# Patient Record
Sex: MALE | Employment: OTHER | URBAN - METROPOLITAN AREA
[De-identification: names, ages, dates, MRNs, and addresses within clinical notes are randomized per-mention and may not be internally consistent; named-entity substitution may affect disease eponyms.]

---

## 2017-01-16 ENCOUNTER — TELEPHONE (OUTPATIENT)
Dept: CARDIOLOGY | Facility: CLINIC | Age: 77
End: 2017-01-16

## 2017-01-16 DIAGNOSIS — I10 ESSENTIAL HYPERTENSION, BENIGN: ICD-10-CM

## 2017-01-16 DIAGNOSIS — I10 BENIGN ESSENTIAL HYPERTENSION: Primary | ICD-10-CM

## 2017-01-16 NOTE — TELEPHONE ENCOUNTER
Call from patient with a BP update. He states he has checked his BP for about 6 weeks, usually mid-morning after his morning meds. His average readings are 140/70 with a range from 135-/182/74-85. He states even with the higher readings he feels well, no headaches or vision issues. He is aware that Dr. Guidry may want to increase his medications. He states he has been compliant with the current med list of lopressor 25mg BID and lisinopril 10mg daily. He asks to be called on his home phone first.  Will message Dr. Guidry to review

## 2017-01-19 NOTE — TELEPHONE ENCOUNTER
Attempted to contact patient to discuss Dr. Guidry's recommendation for increasing lisinopril to 20mg daily, TAMANNA visit w/ bmp in 2 weeks. Left message for patient to call back

## 2017-01-20 RX ORDER — LISINOPRIL 20 MG/1
20 TABLET ORAL DAILY
Qty: 90 TABLET | Refills: 1 | Status: SHIPPED | OUTPATIENT
Start: 2017-01-20 | End: 2017-01-27

## 2017-01-20 NOTE — TELEPHONE ENCOUNTER
Spoke with patient regarding increasing Lisinopril dose to 20 mg daily. New prescription e scribed.  Patient agrees with plan and will continue to monitor and record BP's. Appt OV and BMP orders entered for 1 week as patient will be leaving 1-28-17 for 2 months in [place]. Patient transferred to scheduling to arrange appointments.

## 2017-01-27 ENCOUNTER — OFFICE VISIT (OUTPATIENT)
Dept: CARDIOLOGY | Facility: CLINIC | Age: 77
End: 2017-01-27
Attending: INTERNAL MEDICINE
Payer: COMMERCIAL

## 2017-01-27 VITALS
SYSTOLIC BLOOD PRESSURE: 150 MMHG | WEIGHT: 191 LBS | DIASTOLIC BLOOD PRESSURE: 82 MMHG | HEIGHT: 71 IN | BODY MASS INDEX: 26.74 KG/M2 | HEART RATE: 60 BPM

## 2017-01-27 DIAGNOSIS — I25.10 CORONARY ARTERY DISEASE INVOLVING NATIVE CORONARY ARTERY OF NATIVE HEART WITHOUT ANGINA PECTORIS: ICD-10-CM

## 2017-01-27 DIAGNOSIS — I10 BENIGN ESSENTIAL HYPERTENSION: Primary | ICD-10-CM

## 2017-01-27 DIAGNOSIS — I10 BENIGN ESSENTIAL HYPERTENSION: ICD-10-CM

## 2017-01-27 LAB
ANION GAP SERPL CALCULATED.3IONS-SCNC: 9.2 MMOL/L (ref 6–17)
BUN SERPL-MCNC: 18 MG/DL (ref 7–30)
CALCIUM SERPL-MCNC: 9 MG/DL (ref 8.5–10.5)
CHLORIDE SERPL-SCNC: 106 MMOL/L (ref 98–107)
CO2 SERPL-SCNC: 29 MMOL/L (ref 23–29)
CREAT SERPL-MCNC: 0.97 MG/DL (ref 0.7–1.3)
GFR SERPL CREATININE-BSD FRML MDRD: 75 ML/MIN/1.7M2
GLUCOSE SERPL-MCNC: 101 MG/DL (ref 70–105)
POTASSIUM SERPL-SCNC: 5.2 MMOL/L (ref 3.5–5.1)
SODIUM SERPL-SCNC: 139 MMOL/L (ref 136–145)

## 2017-01-27 PROCEDURE — 80048 BASIC METABOLIC PNL TOTAL CA: CPT | Performed by: INTERNAL MEDICINE

## 2017-01-27 PROCEDURE — 99213 OFFICE O/P EST LOW 20 MIN: CPT | Performed by: PHYSICIAN ASSISTANT

## 2017-01-27 PROCEDURE — 36415 COLL VENOUS BLD VENIPUNCTURE: CPT | Performed by: INTERNAL MEDICINE

## 2017-01-27 RX ORDER — AMLODIPINE BESYLATE 5 MG/1
5 TABLET ORAL DAILY
Qty: 30 TABLET | Refills: 3 | Status: SHIPPED | OUTPATIENT
Start: 2017-01-27 | End: 2017-01-27

## 2017-01-27 RX ORDER — LISINOPRIL 20 MG/1
TABLET ORAL
Qty: 90 TABLET | Refills: 1 | COMMUNITY
Start: 2017-01-27 | End: 2017-07-03

## 2017-01-27 RX ORDER — AMLODIPINE BESYLATE 5 MG/1
5 TABLET ORAL DAILY
Qty: 90 TABLET | Refills: 3 | Status: SHIPPED | OUTPATIENT
Start: 2017-01-27 | End: 2018-04-30

## 2017-01-27 NOTE — LETTER
1/27/2017    [facility]  [address] [address] [address]    RE: Saul Garcia       Dear Colleague,    I had the pleasure of seeing Saul Garcia in the [facility].    PRIMARY CARDIOLOGIST:  Dr. Jacques Guidry.      REASON FOR VISIT:  Hypertension followup.     Saul is a delightful 76-year-old gentleman with past medical history notable for CAD (bypass x4 in 2011 with LIMA to LAD, SVG to OM and a sequential SVG to first and second diagonal), hypertension, postoperative atrial fibrillation after his bypass, borderline ascending aortic dilatation (3.7 cm), hyperlipidemia, and BPH.      The patient had been doing well until approximately 2 weeks ago when he developed high blood pressures. He contact our clinic for this and his lisinopril was up titrated to 20 mg 1 time daily.  He was set up for TAMANNA followup for further evaluation of this.      Saul tells me that he is doing well without any symptoms of chest pain, jaw, neck or arm discomfort, palpitations, near-syncope, syncope, orthopnea or PND.  He continues to be active.  He states that he will be leaving town for a while on Sunday with his family.      CURRENT CARDIAC MEDICATIONS:   1.  Lisinopril 10 mg 1 time daily (this was decreased today).   2.  Metoprolol 25 mg 2 times daily.   3.  Atorvastatin 80 mg 1 time daily.   4.  Nitrostat 0.4 mg as needed for chest pain.   5.  Aspirin 81 mg 1 time daily.   6.  Amlodipine 5 mg 1 time daily (this was started today).      The remainder of his allergies, social history and review of systems were reviewed and as documented separately.      VITAL SIGNS:  Blood pressure 150/82 mmHg, pulse 60 beats per minute, weight 181 pounds.      PHYSICAL EXAMINATION:  Documented separately.      LABORATORY DATA:  Today, BMP shows sodium 139, potassium 5.2, BUN 18, creatinine 0.97, GFR 75.      ASSESSMENT AND PLAN:   Saul is a delightful 76-year-old gentleman who  comes in for hypertension followup after recent up-titration of his lisinopril from 10 to 20 mg daily.  His BMP today shows hyperkalemia with potassium 5.2.  We will decrease his lisinopril to 10 mg.  We will add amlodipine 5 mg 1 time daily to his regimen.  We did also talk about alternative option of switching his beta blockade agent to carvedilol for more alpha effect.  The patient, however, will be leaving town on Sunday, which is just in a couple of days, and therefore he will not be able to come back for followup for reevaluation of his heart rates.  We will have him start amlodipine 5 mg 1 time daily.  I would like to obtain a repeat BMP to ensure his potassium does come back down to normal range.  The patient states that he can get this done somewhere else.  I will print the lab order for him to take with him.  The patient does have a blood pressure cuff that he can use at home.  He was instructed to let us know if his blood pressures continue to be elevated.  Our goal is to be around 120/80 for him.  The patient verbalized understanding of this.      Thank you for allowing me to participate in the care of this delightful patient today.     Sincerely,    Jodi Nicolas PA-C     [facility]

## 2017-01-27 NOTE — PATIENT INSTRUCTIONS
Today's Plan:   1. Decrease Lisinopril to half tablet (10 mg) one time daily.   2. Start Amlodipine (Norvasc) one tablet one time daily.   3. Get repeat lab to check your potassium level. I will print this order for you.     If you have questions or concerns please call my nurse at [phone].     Scheduling phone number: [phone]  Reminder: Please bring in all current medications, over the counter supplements and vitamin bottles to your next appointment.    It was a pleasure seeing you today!     Jodi Nicolas  1/27/2017

## 2017-01-27 NOTE — MR AVS SNAPSHOT
After Visit Summary   1/27/2017    Saul Garcia    MRN: [id number]           Patient Information     Date Of Birth          1940        Visit Information        Provider Department      1/27/2017 10:10 AM Jodi Nicolas PA-C [facility]        Today's Diagnoses     Benign essential hypertension           Care Instructions    Today's Plan:   1. Decrease Lisinopril to half tablet (10 mg) one time daily.   2. Start Amlodipine (Norvasc) one tablet one time daily.   3. Get repeat lab to check your potassium level. I will print this order for you.     If you have questions or concerns please call my nurse at [phone].     Scheduling phone number: [phone]  Reminder: Please bring in all current medications, over the counter supplements and vitamin bottles to your next appointment.    It was a pleasure seeing you today!     Jodi Nicolas  1/27/2017            Follow-ups after your visit        Future tests that were ordered for you today     Open Future Orders        Priority Expected Expires Ordered    Basic metabolic panel Routine 2/2/2017 1/27/2019 1/27/2017            Who to contact     If you have questions or need follow up information about today's clinic visit or your schedule please contact [facility] directly at [phone].  Normal or non-critical lab and imaging results will be communicated to you by MyChart, letter or phone within 4 business days after the clinic has received the results. If you do not hear from us within 7 days, please contact the clinic through MyChart or phone. If you have a critical or abnormal lab result, we will notify you by phone as soon as possible.  Submit refill requests through [employer] or call your pharmacy and they will forward the refill request to us. Please allow 3 business days for your refill to be completed.          Additional Information About Your Visit       "  MyChart Information     [employer]ay lets you send messages to your doctor, view your test results, renew your prescriptions, schedule appointments and more. To sign up, go to [email or web address] . Click on \"Log in\" on the left side of the screen, which will take you to the Welcome page. Then click on \"Sign up Now\" on the right side of the page.     You will be asked to enter the access code listed below, as well as some personal information. Please follow the directions to create your username and password.     Your access code is: [email or web address]  Expires: 2017 11:03 AM     Your access code will  in 90 days. If you need help or a new code, please call your [place] clinic or [phone].        Care EveryWhere ID     This is your Care EveryWhere ID. This could be used by other organizations to access your [place] medical records  [id number]        Your Vitals Were     Pulse Height BMI (Body Mass Index)             60 1.803 m (5' 11\") 26.65 kg/m2          Blood Pressure from Last 3 Encounters:   17 150/82   16 152/70   09/28/15 136/64    Weight from Last 3 Encounters:   17 86.637 kg (191 lb)   16 88.406 kg (194 lb 14.4 oz)   09/28/15 86.365 kg (190 lb 6.4 oz)              We Performed the Following     Follow-Up with Cardiac Advanced Practice Provider          Today's Medication Changes          These changes are accurate as of: 17 11:03 AM.  If you have any questions, ask your nurse or doctor.               Start taking these medicines.        Dose/Directions    amLODIPine 5 MG tablet   Commonly known as:  NORVASC   Used for:  Benign essential hypertension   Started by:  Jodi Nicolas PA-C        Dose:  5 mg   Take 1 tablet (5 mg) by mouth daily   Quantity:  30 tablet   Refills:  3         These medicines have changed or have updated prescriptions.        Dose/Directions    lisinopril 20 MG tablet   Commonly known as:  PRINIVIL/ZESTRIL   This may have changed:    - how " much to take  - how to take this  - when to take this  - additional instructions   Used for:  Benign essential hypertension   Changed by:  Jodi Nicolas PA-C        Take half tablet one time daily.   Quantity:  90 tablet   Refills:  1            Where to get your medicines      Some of these will need a paper prescription and others can be bought over the counter.  Ask your nurse if you have questions.     Bring a paper prescription for each of these medications    - amLODIPine 5 MG tablet             Primary Care Provider Office Phone # Fax #    [facility] [phone] [address] [address] [address]        Thank you!     Thank you for choosing [facility] [place]  for your care. Our goal is always to provide you with excellent care. Hearing back from our patients is one way we can continue to improve our services. Please take a few minutes to complete the written survey that you may receive in the mail after your visit with us. Thank you!             Your Updated Medication List - Protect others around you: Learn how to safely use, store and throw away your medicines at www.disposemymeds.org.          This list is accurate as of: 1/27/17 11:03 AM.  Always use your most recent med list.                   Brand Name Dispense Instructions for use    amLODIPine 5 MG tablet    NORVASC    30 tablet    Take 1 tablet (5 mg) by mouth daily       aspirin 81 MG tablet      Take 1 tablet by mouth daily.       atorvastatin 80 MG tablet    LIPITOR    90 tablet    Take 1 tablet (80 mg) by mouth daily       lisinopril 20 MG tablet    PRINIVIL/ZESTRIL    90 tablet    Take half tablet one time daily.       metoprolol 25 MG tablet    LOPRESSOR    180 tablet    Take 1 tablet (25 mg) by mouth 2 times daily       nitroglycerin 0.4 MG sublingual tablet    NITROSTAT    25 tablet    Place 1 tablet (0.4 mg) under the tongue every 5 minutes as needed for chest  pain

## 2017-01-27 NOTE — PROGRESS NOTES
HPI and Plan:   See dictation. Confirmation # [id number].    Orders this Visit:  Orders Placed This Encounter   Procedures     Basic metabolic panel     Orders Placed This Encounter   Medications     amLODIPine (NORVASC) 5 MG tablet     Sig: Take 1 tablet (5 mg) by mouth daily     Dispense:  30 tablet     Refill:  3     lisinopril (PRINIVIL/ZESTRIL) 20 MG tablet     Sig: Take half tablet one time daily.     Dispense:  90 tablet     Refill:  1     Medications Discontinued During This Encounter   Medication Reason     lisinopril (PRINIVIL/ZESTRIL) 20 MG tablet Reorder         Encounter Diagnosis   Name Primary?     Benign essential hypertension        CURRENT MEDICATIONS:  Current Outpatient Prescriptions   Medication Sig Dispense Refill     amLODIPine (NORVASC) 5 MG tablet Take 1 tablet (5 mg) by mouth daily 30 tablet 3     lisinopril (PRINIVIL/ZESTRIL) 20 MG tablet Take half tablet one time daily. 90 tablet 1     metoprolol (LOPRESSOR) 25 MG tablet Take 1 tablet (25 mg) by mouth 2 times daily 180 tablet 3     atorvastatin (LIPITOR) 80 MG tablet Take 1 tablet (80 mg) by mouth daily 90 tablet 3     nitroglycerin (NITROSTAT) 0.4 MG SL tablet Place 1 tablet (0.4 mg) under the tongue every 5 minutes as needed for chest pain 25 tablet 4     aspirin 81 MG tablet Take 1 tablet by mouth daily.  3     [DISCONTINUED] lisinopril (PRINIVIL/ZESTRIL) 20 MG tablet Take 1 tablet (20 mg) by mouth daily 90 tablet 1       ALLERGIES   No Known Allergies    PAST MEDICAL HISTORY:  Past Medical History   Diagnosis Date     Essential hypertension, benign      Pure hypercholesterolemia       BPH      Basal cell carcinoma      CAD (coronary artery disease)      CABG 2011: LIMA to LAD, sequential SVG to D1 and D2, SVG to OM     Aortic root dilatation (H)      3.7 by echo 10/13     Atrial fibrillation (H)      Post op       PAST SURGICAL HISTORY:  Past Surgical History   Procedure Laterality Date     Surgical history of -   2/28/01     Lt. jaleesa  "hernia repair     Surgical history of -   6/88     Orchiectomy, seminoma     Hc remove tonsils/adenoids,<12 y/o  Childhood     T & A <13y.o.     Rotator cuff repair rt/lt  2007     rt shoulder     Surgical history of -   2008     rt knee repair     Coronary artery bypass  march 2011     Biopsy of skin lesion         FAMILY HISTORY:  Family History   Problem Relation Age of Onset     Arthritis Mother      Eye Disorder Mother      mac.degen.     Neurologic Disorder Mother      Dementia     HEART DISEASE Father      chf       SOCIAL HISTORY:  Social History     Social History     Marital Status:      Spouse Name: JACQUELINE     Number of Children: 2     Years of Education: N/A     Occupational History      Retired     Social History Main Topics     Smoking status: Never Smoker      Smokeless tobacco: Never Used     Alcohol Use: Yes      Comment: 15/week     Drug Use: No     Sexual Activity: Yes     Other Topics Concern     Caffeine Concern No     no caffeine     Sleep Concern No     Stress Concern Yes     Weight Concern No     Special Diet Yes     low sodium     Exercise Yes     biking, walking     Social History Narrative       Review of Systems:  Skin:  Negative     Eyes:  Positive for glasses  ENT:  Positive for hearing loss  Respiratory:  Negative    Cardiovascular:    Positive for;palpitations  Gastroenterology: Negative    Genitourinary:  Negative    Musculoskeletal:  Positive for back pain;arthritis  Neurologic:  Negative    Psychiatric:  Negative    Heme/Lymph/Imm:  Negative    Endocrine:  Negative      Physical Exam:  Vitals: /82 mmHg  Pulse 60  Ht 1.803 m (5' 11\")  Wt 86.637 kg (191 lb)  BMI 26.65 kg/m2   Please refer to dictation for physical exam    Recent Lab Results:  LIPID RESULTS:  Lab Results   Component Value Date    CHOL 133 11/21/2016    HDL 54 11/21/2016    LDL 58 11/21/2016    TRIG 107 11/21/2016    CHOLHDLRATIO 2.8 09/28/2015       LIVER ENZYME RESULTS:  Lab Results   Component Value " Date    AST 21 07/08/2015    ALT 22 07/08/2015       CBC RESULTS:  Lab Results   Component Value Date    WBC 6.9 04/09/2015    RBC 4.71 04/09/2015    HGB 15.1 04/09/2015    HCT 44.4 04/09/2015    MCV 98 04/01/2011    MCH 34.0* 04/01/2011    MCHC 34.6 04/01/2011    RDW 12.6 04/01/2011     04/09/2015       BMP RESULTS:  Lab Results   Component Value Date     01/27/2017    POTASSIUM 5.2* 01/27/2017    CHLORIDE 106 01/27/2017    CO2 29 01/27/2017    ANIONGAP 9.2 01/27/2017     01/27/2017    BUN 18 01/27/2017    BUN 19.7 11/27/2012    CR 0.97 01/27/2017    GFRESTIMATED 75 01/27/2017    GFRESTBLACK >90 01/27/2017    DEENA 9.0 01/27/2017        A1C RESULTS:  Lab Results   Component Value Date    A1C 5.4 03/28/2011       INR RESULTS:  Lab Results   Component Value Date    INR 1.36* 03/29/2011    INR 1.43* 03/29/2011           CC  Jacques Guidry MD  [facility]  [address]  [place] [address]    Jodi Nicolas PA-C   1/27/2017  Pager: [phone]

## 2017-01-28 NOTE — PROGRESS NOTES
PRIMARY CARDIOLOGIST:  Dr. Jacques Guidry.      REASON FOR VISIT:  Hypertension followup.      HISTORY OF PRESENT ILLNESS:     Saul is a delightful 76-year-old gentleman with past medical history notable for CAD (bypass x4 in 2011 with LIMA to LAD, SVG to OM and a sequential SVG to first and second diagonal), hypertension, postoperative atrial fibrillation after his bypass, borderline ascending aortic dilatation (3.7 cm), hyperlipidemia, and BPH.      The patient had been doing well until approximately 2 weeks ago when he developed high blood pressures. He contact our clinic for this and his lisinopril was up titrated to 20 mg 1 time daily.  He was set up for TAMANNA followup for further evaluation of this.      Saul tells me that he is doing well without any symptoms of chest pain, jaw, neck or arm discomfort, palpitations, near-syncope, syncope, orthopnea or PND.  He continues to be active.  He states that he will be leaving town for a while on Sunday with his family.      CURRENT CARDIAC MEDICATIONS:   1.  Lisinopril 10 mg 1 time daily (this was decreased today).   2.  Metoprolol 25 mg 2 times daily.   3.  Atorvastatin 80 mg 1 time daily.   4.  Nitrostat 0.4 mg as needed for chest pain.   5.  Aspirin 81 mg 1 time daily.   6.  Amlodipine 5 mg 1 time daily (this was started today).      The remainder of his allergies, social history and review of systems were reviewed and as documented separately.      VITAL SIGNS:  Blood pressure 150/82 mmHg, pulse 60 beats per minute, weight 181 pounds.      PHYSICAL EXAMINATION:  Documented separately.      LABORATORY DATA:  Today, BMP shows sodium 139, potassium 5.2, BUN 18, creatinine 0.97, GFR 75.      ASSESSMENT AND PLAN:   Saul is a delightful 76-year-old gentleman who comes in for hypertension followup after recent up-titration of his lisinopril from 10 to 20 mg daily.  His BMP today shows hyperkalemia with potassium 5.2.  We will decrease his lisinopril to 10 mg.   We will add amlodipine 5 mg 1 time daily to his regimen.  We did also talk about alternative option of switching his beta blockade agent to carvedilol for more alpha effect.  The patient, however, will be leaving town on , which is just in a couple of days, and therefore he will not be able to come back for followup for reevaluation of his heart rates.  We will have him start amlodipine 5 mg 1 time daily.  I would like to obtain a repeat BMP to ensure his potassium does come back down to normal range.  The patient states that he can get this done somewhere else.  I will print the lab order for him to take with him.  The patient does have a blood pressure cuff that he can use at home.  He was instructed to let us know if his blood pressures continue to be elevated.  Our goal is to be around 120/80 for him.  The patient verbalized understanding of this.      Thank you for allowing me to participate in the care of this delightful patient today.         JAYME GRANT PA-C             D: 2017 16:10   T: 2017 22:46   MT: MARYLIN      Name:     STEFANI HUYNH   MRN:      [id number]        Account:      [id number]   :      1940           Service Date: 2017      Document: [id number]

## 2017-02-17 ENCOUNTER — TRANSFERRED RECORDS (OUTPATIENT)
Dept: CARDIOLOGY | Facility: CLINIC | Age: 77
End: 2017-02-17

## 2017-02-17 LAB
ANION GAP SERPL CALCULATED.3IONS-SCNC: 8 MMOL/L
BUN SERPL-MCNC: 25 MG/DL
BUN/CREATININE RATIO: 24.8
CALCIUM SERPL-MCNC: 9.2 MG/DL
CHLORIDE SERPLBLD-SCNC: 102 MMOL/L
CO2 SERPL-SCNC: 26 MMOL/L
CREAT SERPL-MCNC: 1.01 MG/DL
GFR SERPL CREATININE-BSD FRML MDRD: 72 ML/MIN/1.73M2
GLUCOSE SERPL-MCNC: 88 MG/DL (ref 70–99)
POTASSIUM SERPL-SCNC: 4.5 MMOL/L
SODIUM SERPL-SCNC: 136 MMOL/L

## 2017-02-20 ENCOUNTER — DOCUMENTATION ONLY (OUTPATIENT)
Dept: CARDIOLOGY | Facility: CLINIC | Age: 77
End: 2017-02-20

## 2017-02-20 NOTE — LETTER
February 20, 2017     TO: Saul Garcia   [address] [address]  [facility] [address]     Dear Juan Alberto Radha,  The results of your recent Laboratory tests.    Results for orders placed or performed in visit on 02/20/17   Basic metabolic panel   Result Value Ref Range    Sodium 136 135 - 145 mmol/L    Potassium 4.5 3.5 - 5.2 mmol/L    Chloride 102 96 - 110 mmol/L    Carbon Dioxide 26 19 - 31 mmol/L    Anion Gap 8 4 - 18 mmol/L    Glucose 88 65 - 99 mg/dL    Urea Nitrogen 25 8 - 25 mg/dL    Creatinine 1.01 0.6 - 1.5 mg/dL    Calcium 9.2 8.3 - 10.4 mg/dL    GFR Estimate 72 >=61 ml/min/1.73m2    GFR Estimate If Black 83 >=61 ml/min/1.73m2    BUN/Creatinine Ratio 24.8 10 - 28     Your test results fall within the expected range(s) or remain unchanged from previous results.  Please continue with current treatment plan. Please call [phone] with questions or concerns.     Sincerely,  [facility]

## 2017-07-03 DIAGNOSIS — I10 BENIGN ESSENTIAL HYPERTENSION: Primary | ICD-10-CM

## 2017-07-03 RX ORDER — LISINOPRIL 10 MG/1
10 TABLET ORAL DAILY
Qty: 90 TABLET | Refills: 3 | Status: SHIPPED | OUTPATIENT
Start: 2017-07-03 | End: 2018-07-09

## 2017-10-04 DIAGNOSIS — E78.5 HYPERLIPIDEMIA LDL GOAL <130: ICD-10-CM

## 2017-10-04 RX ORDER — ATORVASTATIN CALCIUM 80 MG/1
80 TABLET, FILM COATED ORAL DAILY
Qty: 90 TABLET | Refills: 3 | Status: SHIPPED | OUTPATIENT
Start: 2017-10-04 | End: 2018-10-01

## 2017-10-04 NOTE — TELEPHONE ENCOUNTER
Received refill request for:  Atorvastatin   Last OV was: 1/27/17 w/ Jodi JARAMILLO  Labs/EKG: Lipids 11/21/16   F/U scheduled: Lipids and f/u w/ Iva Haas scheduled for 11/27/17   New script sent to: [facility]

## 2017-11-13 DIAGNOSIS — I10 ESSENTIAL HYPERTENSION, BENIGN: ICD-10-CM

## 2017-11-13 RX ORDER — METOPROLOL TARTRATE 25 MG/1
25 TABLET, FILM COATED ORAL 2 TIMES DAILY
Qty: 180 TABLET | Refills: 3 | Status: SHIPPED | OUTPATIENT
Start: 2017-11-13 | End: 2018-07-03

## 2017-11-13 NOTE — TELEPHONE ENCOUNTER
Received refill request for:  Metoprolol Tartrate  Last OV was: 1/27/2017 with GINGER Longoria  Labs/EKG: n/a  F/U scheduled: 11/27/2017 with AIDAN Wu  New script sent to: [facility]

## 2017-11-27 ENCOUNTER — OFFICE VISIT (OUTPATIENT)
Dept: CARDIOLOGY | Facility: CLINIC | Age: 77
End: 2017-11-27
Attending: INTERNAL MEDICINE
Payer: COMMERCIAL

## 2017-11-27 VITALS
SYSTOLIC BLOOD PRESSURE: 112 MMHG | BODY MASS INDEX: 26.88 KG/M2 | WEIGHT: 192 LBS | DIASTOLIC BLOOD PRESSURE: 60 MMHG | HEIGHT: 71 IN | HEART RATE: 69 BPM

## 2017-11-27 DIAGNOSIS — I10 BENIGN ESSENTIAL HYPERTENSION: ICD-10-CM

## 2017-11-27 DIAGNOSIS — I25.10 CORONARY ARTERY DISEASE INVOLVING NATIVE CORONARY ARTERY OF NATIVE HEART WITHOUT ANGINA PECTORIS: ICD-10-CM

## 2017-11-27 DIAGNOSIS — I71.21 ASCENDING AORTIC ANEURYSM (H): Primary | ICD-10-CM

## 2017-11-27 LAB
CHOLEST SERPL-MCNC: 124 MG/DL
HDLC SERPL-MCNC: 39 MG/DL
LDLC SERPL CALC-MCNC: 55 MG/DL
NONHDLC SERPL-MCNC: 85 MG/DL
TRIGL SERPL-MCNC: 150 MG/DL

## 2017-11-27 PROCEDURE — 80061 LIPID PANEL: CPT | Performed by: INTERNAL MEDICINE

## 2017-11-27 PROCEDURE — 36415 COLL VENOUS BLD VENIPUNCTURE: CPT | Performed by: INTERNAL MEDICINE

## 2017-11-27 PROCEDURE — 99214 OFFICE O/P EST MOD 30 MIN: CPT | Performed by: NURSE PRACTITIONER

## 2017-11-27 RX ORDER — VIT A/VIT C/VIT E/ZINC/COPPER 2148-113
TABLET ORAL
COMMUNITY

## 2017-11-27 RX ORDER — CHOLECALCIFEROL (VITAMIN D3) 50 MCG
1 TABLET ORAL 2 TIMES DAILY
COMMUNITY

## 2017-11-27 RX ORDER — OMEGA-3 FATTY ACIDS/FISH OIL 300-1000MG
200 CAPSULE ORAL EVERY 4 HOURS PRN
COMMUNITY

## 2017-11-27 NOTE — LETTER
11/27/2017    [facility]  [address] [address] [address]    RE: Saul Garcia       Dear Colleague,    I had the pleasure of seeing Saul Garcia in the [facility].    Cardiology Clinic Progress Note  Saul Garcia MRN# [id number]   YOB: 1940 Age: 77 year old     Reason for visit: Annual follow up           Assessment and Plan:     1. Coronary artery disease    S/p CABG x4 in 2011 (LIMA to LAD, SVG to OM, sequential SVG to first second diagonal)    No cardiopulmonary complaints    Follow-up with Dr. Guidry in one year    2. Hypertension    Well-controlled on lisinopril 10 mg daily and amlodipine 5 mg daily    Continue metoprolol 25 mg b.i.d., lisinopril 10 mg daily and amlodipine 5 mg daily    3. Hypercholesteremia    LDL today was 55    Continue atorvastatin 80 mg daily    Repeat fasting lipids in one year    4. Borderline ascending aortic dilatation    Last echo in 2013 showed dilatation at 3.7 cm    Repeat echocardiogram in one year         History of Presenting Illness:    Saul Garcia is a very pleasant 77 year old patient of Dr. Guidry who presents today for a follow up. He has a past medical history notable for CAD (bypass x4 in 2011 with LIMA to LAD, SVG to OM and a sequential SVG to first and second diagonal), hypertension, postoperative atrial fibrillation after his bypass, borderline ascending aortic dilatation (3.7 cm), hyperlipidemia, and BPH.    In January 2017 he was seen in clinic for elevated blood pressure.  His lisinopril was titrated up to 20 mg daily and return to for follow-up.  Unfortunately, his potassium was elevated and his dose was brought back to 10 mg daily and amlodipine 5 mg was added.  His potassium normalized.  His blood pressure is currently well-controlled at 112/60 today.  He had fasting lipids showed total cholesterol 124, HDL 39, LDL 55 and triglycerides 150.    Today in clinic he states he  "has no episodes of chest discomfort, shortness of breath, PND, orthopnea or lower extremity edema.  He does note an occasional intermittent heartbeat when awakening in the mornings.  He is asymptomatic aside from the palpitation.     He travels to [place] with his wife for three months out of the winter.  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          This note was completed in part using Dragon voice recognition software. Although reviewed after completion, some word and grammatical errors may occur       Review of Systems:   Review of Systems:  Skin:  Negative     Eyes:  Positive for glasses  ENT:  Positive for hearing loss  Respiratory:  Positive for dyspnea on exertion  Cardiovascular:    Positive for;palpitations  Gastroenterology: Negative    Genitourinary:  Negative    Musculoskeletal:  Positive for back pain;arthritis  Neurologic:  Negative    Psychiatric:  Negative    Heme/Lymph/Imm:  Negative    Endocrine:  Negative                Physical Exam:     Vitals: /60  Pulse 69  Ht 1.803 m (5' 11\")  Wt 87.1 kg (192 lb)  BMI 26.78 kg/m2  Constitutional:  cooperative, alert and oriented, well developed, well nourished, in no acute distress        Skin:  warm and dry to the touch, no apparent skin lesions or masses noted        Head:  normocephalic, no masses or lesions        Eyes:  pupils equal and round;conjunctivae and lids unremarkable;sclera white;no xanthalasma;no nystagmus        ENT:  no pallor or cyanosis, dentition good        Neck:           Chest:  clear to auscultation;normal symmetry        Cardiac: normal S1 and S2;no murmurs, gallops or rubs detected                  Abdomen:  abdomen soft        Vascular:                                        Extremities and Back:  no edema        Neurological:  affect appropriate                 Medications:     Current Outpatient Prescriptions   Medication Sig " Dispense Refill     ibuprofen 200 MG capsule Take 200 mg by mouth every 4 hours as needed for fever       Cholecalciferol (VITAMIN D) 2000 UNITS tablet Take 1 tablet by mouth 2 times daily       Multiple Vitamins-Minerals (PRESERVISION AREDS) TABS        metoprolol (LOPRESSOR) 25 MG tablet Take 1 tablet (25 mg) by mouth 2 times daily 180 tablet 3     atorvastatin (LIPITOR) 80 MG tablet Take 1 tablet (80 mg) by mouth daily 90 tablet 3     lisinopril (PRINIVIL/ZESTRIL) 10 MG tablet Take 1 tablet (10 mg) by mouth daily 90 tablet 3     amLODIPine (NORVASC) 5 MG tablet Take 1 tablet (5 mg) by mouth daily 90 tablet 3     nitroglycerin (NITROSTAT) 0.4 MG SL tablet Place 1 tablet (0.4 mg) under the tongue every 5 minutes as needed for chest pain 25 tablet 4     aspirin 81 MG tablet Take 1 tablet by mouth daily.  3           Family History   Problem Relation Age of Onset     Arthritis Mother      Eye Disorder Mother      mac.degen.     Neurologic Disorder Mother      Dementia     HEART DISEASE Father      chf       Social History     Social History     Marital status:      Spouse name: DIRKJuan Alberto     Number of children: 2     Years of education: N/A     Occupational History      Retired     Social History Main Topics     Smoking status: Never Smoker     Smokeless tobacco: Never Used     Alcohol use Yes      Comment: 15/week     Drug use: No     Sexual activity: Yes     Other Topics Concern     Caffeine Concern No     no caffeine     Sleep Concern No     Stress Concern Yes     Weight Concern No     Special Diet Yes     low sodium     Exercise Yes     biking, walking     Social History Narrative            Past Medical History:     Past Medical History:   Diagnosis Date      BPH      Aortic root dilatation (H)     3.7 by echo 10/13     Atrial fibrillation (H)     Post op     Basal cell carcinoma      CAD (coronary artery disease)     CABG 2011: LIMA to LAD, sequential SVG to D1 and D2, SVG to OM     Essential hypertension,  benign      Pure hypercholesterolemia               Past Surgical History:     Past Surgical History:   Procedure Laterality Date     BIOPSY OF SKIN LESION       CORONARY ARTERY BYPASS  march 2011     HC REMOVE TONSILS/ADENOIDS,<11 Y/O  Childhood    T & A <13y.o.     ROTATOR CUFF REPAIR RT/LT  2007    rt shoulder     SURGICAL HISTORY OF -   2/28/01    Lt. inguinal hernia repair     SURGICAL HISTORY OF -   6/88    Orchiectomy, seminoma     SURGICAL HISTORY OF -   2008    rt knee repair              Allergies:   Review of patient's allergies indicates no known allergies.       Data:   All laboratory data reviewed:    LAST CHOLESTEROL:  Lab Results   Component Value Date    CHOL 124 11/27/2017     Lab Results   Component Value Date    HDL 39 11/27/2017     Lab Results   Component Value Date    LDL 55 11/27/2017     Lab Results   Component Value Date    TRIG 150 11/27/2017     Lab Results   Component Value Date    CHOLHDLRATIO 2.8 09/28/2015       LAST BMP:  Lab Results   Component Value Date     02/17/2017      Lab Results   Component Value Date    POTASSIUM 4.5 02/17/2017     Lab Results   Component Value Date    CHLORIDE 102 02/17/2017     Lab Results   Component Value Date    DEENA 9.2 02/17/2017     Lab Results   Component Value Date    CO2 26 02/17/2017     Lab Results   Component Value Date    BUN 24.8 02/17/2017    BUN 25 02/17/2017     Lab Results   Component Value Date    CR 1.01 02/17/2017     Lab Results   Component Value Date    GLC 88 02/17/2017       LAST CBC:  Lab Results   Component Value Date    WBC 6.9 04/09/2015     Lab Results   Component Value Date    RBC 4.71 04/09/2015     Lab Results   Component Value Date    HGB 15.1 04/09/2015     Lab Results   Component Value Date    HCT 44.4 04/09/2015     Lab Results   Component Value Date    MCV 98 04/01/2011     Lab Results   Component Value Date    MCH 34.0 04/01/2011     Lab Results   Component Value Date    MCHC 34.6 04/01/2011     Lab Results    Component Value Date    RDW 12.6 04/01/2011     Lab Results   Component Value Date     04/09/2015     Thank you for allowing me to participate in the care of your patient.    Sincerely,     ROCIO NOLASCO [facility]

## 2017-11-27 NOTE — MR AVS SNAPSHOT
"              After Visit Summary   11/27/2017    Saul Gracia    MRN: [id number]           Patient Information     Date Of Birth          1940        Visit Information        Provider Department      11/27/2017 8:30 AM Iva Haas APRN CNP [facility]        Today's Diagnoses     Coronary artery disease involving native coronary artery of native heart without angina pectoris          Care Instructions    1) Follow up with Dr. Guidry in 1 year.   2) No change in meds.          Follow-ups after your visit        Who to contact     If you have questions or need follow up information about today's clinic visit or your schedule please contact [facility] directly at [phone].  Normal or non-critical lab and imaging results will be communicated to you by [employer], letter or phone within 4 business days after the clinic has received the results. If you do not hear from us within 7 days, please contact the clinic through [employer] or phone. If you have a critical or abnormal lab result, we will notify you by phone as soon as possible.  Submit refill requests through [employer] or call your pharmacy and they will forward the refill request to us. Please allow 3 business days for your refill to be completed.          Additional Information About Your Visit        MyChart Information     Greats lets you send messages to your doctor, view your test results, renew your prescriptions, schedule appointments and more. To sign up, go to [email or web address] . Click on \"Log in\" on the left side of the screen, which will take you to the Welcome page. Then click on \"Sign up Now\" on the right side of the page.     You will be asked to enter the access code listed below, as well as some personal information. Please follow the directions to create your username and password.     Your access code is: [email or web address]  Expires: 2/25/2018  8:40 AM   " "  Your access code will  in 90 days. If you need help or a new code, please call your [place] clinic or [phone].        Care EveryWhere ID     This is your Care EveryWhere ID. This could be used by other organizations to access your [place] medical records  [id number]        Your Vitals Were     Pulse Height BMI (Body Mass Index)             69 1.803 m (5' 11\") 26.78 kg/m2          Blood Pressure from Last 3 Encounters:   17 150/73   17 150/82   16 152/70    Weight from Last 3 Encounters:   17 87.1 kg (192 lb)   17 86.6 kg (191 lb)   16 88.4 kg (194 lb 14.4 oz)              We Performed the Following     Follow-Up with Cardiac Advanced Practice Provider        Primary Care Provider Office Phone # Fax #     [phone] [address] [address]        Equal Access to Services     PILAR LAYNE : Hadii aad ku hadasho Soomaali, waaxda luqadaha, qaybta kaalmada adeegyada, waxay suhail mo . So [facility] [phone].    ATENCIÓN: Si habla español, tiene a benitez disposición servicios gratuitos de asistencia lingüística. Llame al [phone].    We comply with applicable federal civil rights laws and [place] laws. We do not discriminate on the basis of race, color, national origin, age, disability, sex, sexual orientation, or gender identity.            Thank you!     Thank you for choosing [facility]  for your care. Our goal is always to provide you with excellent care. Hearing back from our patients is one way we can continue to improve our services. Please take a few minutes to complete the written survey that you may receive in the mail after your visit with us. Thank you!             Your Updated Medication List - Protect others around you: Learn how to safely use, store and throw away your medicines at www.disposemymeds.org.          This list is " accurate as of: 11/27/17  8:40 AM.  Always use your most recent med list.                   Brand Name Dispense Instructions for use Diagnosis    amLODIPine 5 MG tablet    NORVASC    90 tablet    Take 1 tablet (5 mg) by mouth daily    Benign essential hypertension       aspirin 81 MG tablet      Take 1 tablet by mouth daily.    Essential hypertension, benign       atorvastatin 80 MG tablet    LIPITOR    90 tablet    Take 1 tablet (80 mg) by mouth daily    Hyperlipidemia LDL goal <130       ibuprofen 200 MG capsule      Take 200 mg by mouth every 4 hours as needed for fever        lisinopril 10 MG tablet    PRINIVIL/ZESTRIL    90 tablet    Take 1 tablet (10 mg) by mouth daily    Benign essential hypertension       metoprolol 25 MG tablet    LOPRESSOR    180 tablet    Take 1 tablet (25 mg) by mouth 2 times daily    Essential hypertension, benign       nitroGLYcerin 0.4 MG sublingual tablet    NITROSTAT    25 tablet    Place 1 tablet (0.4 mg) under the tongue every 5 minutes as needed for chest pain    CAD (coronary artery disease)       PRESERVISION AREDS Tabs           vitamin D 2000 UNITS tablet      Take 1 tablet by mouth 2 times daily

## 2017-11-27 NOTE — PROGRESS NOTES
Cardiology Clinic Progress Note  Saul Garcia MRN# [id number]   YOB: 1940 Age: 77 year old     Reason for visit: Annual follow up           Assessment and Plan:     1. Coronary artery disease    S/p CABG x4 in 2011 (LIMA to LAD, SVG to OM, sequential SVG to first second diagonal)    No cardiopulmonary complaints    Follow-up with Dr. Guidry in one year    2. Hypertension    Well-controlled on lisinopril 10 mg daily and amlodipine 5 mg daily    Continue metoprolol 25 mg b.i.d., lisinopril 10 mg daily and amlodipine 5 mg daily    3. Hypercholesteremia    LDL today was 55    Continue atorvastatin 80 mg daily    Repeat fasting lipids in one year    4. Borderline ascending aortic dilatation    Last echo in 2013 showed dilatation at 3.7 cm    Repeat echocardiogram in one year         History of Presenting Illness:    Saul Garcia is a very pleasant 77 year old patient of Dr. Guidry who presents today for a follow up. He has a past medical history notable for CAD (bypass x4 in 2011 with LIMA to LAD, SVG to OM and a sequential SVG to first and second diagonal), hypertension, postoperative atrial fibrillation after his bypass, borderline ascending aortic dilatation (3.7 cm), hyperlipidemia, and BPH.    In January 2017 he was seen in clinic for elevated blood pressure.  His lisinopril was titrated up to 20 mg daily and return to for follow-up.  Unfortunately, his potassium was elevated and his dose was brought back to 10 mg daily and amlodipine 5 mg was added.  His potassium normalized.  His blood pressure is currently well-controlled at 112/60 today.  He had fasting lipids showed total cholesterol 124, HDL 39, LDL 55 and triglycerides 150.    Today in clinic he states he has no episodes of chest discomfort, shortness of breath, PND, orthopnea or lower extremity edema.  He does note an occasional intermittent heartbeat when awakening in the mornings.  He is asymptomatic aside from the palpitation.  "    He travels to [place] with his wife for three months out of the winter.  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          This note was completed in part using Dragon voice recognition software. Although reviewed after completion, some word and grammatical errors may occur       Review of Systems:   Review of Systems:  Skin:  Negative     Eyes:  Positive for glasses  ENT:  Positive for hearing loss  Respiratory:  Positive for dyspnea on exertion  Cardiovascular:    Positive for;palpitations  Gastroenterology: Negative    Genitourinary:  Negative    Musculoskeletal:  Positive for back pain;arthritis  Neurologic:  Negative    Psychiatric:  Negative    Heme/Lymph/Imm:  Negative    Endocrine:  Negative                Physical Exam:     Vitals: /60  Pulse 69  Ht 1.803 m (5' 11\")  Wt 87.1 kg (192 lb)  BMI 26.78 kg/m2  Constitutional:  cooperative, alert and oriented, well developed, well nourished, in no acute distress        Skin:  warm and dry to the touch, no apparent skin lesions or masses noted        Head:  normocephalic, no masses or lesions        Eyes:  pupils equal and round;conjunctivae and lids unremarkable;sclera white;no xanthalasma;no nystagmus        ENT:  no pallor or cyanosis, dentition good        Neck:           Chest:  clear to auscultation;normal symmetry        Cardiac: normal S1 and S2;no murmurs, gallops or rubs detected                  Abdomen:  abdomen soft        Vascular:                                        Extremities and Back:  no edema        Neurological:  affect appropriate                 Medications:     Current Outpatient Prescriptions   Medication Sig Dispense Refill     ibuprofen 200 MG capsule Take 200 mg by mouth every 4 hours as needed for fever       Cholecalciferol (VITAMIN D) 2000 UNITS tablet Take 1 tablet by mouth 2 times daily       Multiple Vitamins-Minerals " (PRESERVISION AREDS) TABS        metoprolol (LOPRESSOR) 25 MG tablet Take 1 tablet (25 mg) by mouth 2 times daily 180 tablet 3     atorvastatin (LIPITOR) 80 MG tablet Take 1 tablet (80 mg) by mouth daily 90 tablet 3     lisinopril (PRINIVIL/ZESTRIL) 10 MG tablet Take 1 tablet (10 mg) by mouth daily 90 tablet 3     amLODIPine (NORVASC) 5 MG tablet Take 1 tablet (5 mg) by mouth daily 90 tablet 3     nitroglycerin (NITROSTAT) 0.4 MG SL tablet Place 1 tablet (0.4 mg) under the tongue every 5 minutes as needed for chest pain 25 tablet 4     aspirin 81 MG tablet Take 1 tablet by mouth daily.  3           Family History   Problem Relation Age of Onset     Arthritis Mother      Eye Disorder Mother      mac.degen.     Neurologic Disorder Mother      Dementia     HEART DISEASE Father      chf       Social History     Social History     Marital status:      Spouse name: JACQUELINE     Number of children: 2     Years of education: N/A     Occupational History      Retired     Social History Main Topics     Smoking status: Never Smoker     Smokeless tobacco: Never Used     Alcohol use Yes      Comment: 15/week     Drug use: No     Sexual activity: Yes     Other Topics Concern     Caffeine Concern No     no caffeine     Sleep Concern No     Stress Concern Yes     Weight Concern No     Special Diet Yes     low sodium     Exercise Yes     biking, walking     Social History Narrative            Past Medical History:     Past Medical History:   Diagnosis Date      BPH      Aortic root dilatation (H)     3.7 by echo 10/13     Atrial fibrillation (H)     Post op     Basal cell carcinoma      CAD (coronary artery disease)     CABG 2011: LIMA to LAD, sequential SVG to D1 and D2, SVG to OM     Essential hypertension, benign      Pure hypercholesterolemia               Past Surgical History:     Past Surgical History:   Procedure Laterality Date     BIOPSY OF SKIN LESION       CORONARY ARTERY BYPASS  march 2011     HC REMOVE  TONSILS/ADENOIDS,<11 Y/O  Childhood    T & A <13y.o.     ROTATOR CUFF REPAIR RT/LT  2007    rt shoulder     SURGICAL HISTORY OF -   2/28/01    Lt. inguinal hernia repair     SURGICAL HISTORY OF -   6/88    Orchiectomy, seminoma     SURGICAL HISTORY OF -   2008    rt knee repair              Allergies:   Review of patient's allergies indicates no known allergies.       Data:   All laboratory data reviewed:    LAST CHOLESTEROL:  Lab Results   Component Value Date    CHOL 124 11/27/2017     Lab Results   Component Value Date    HDL 39 11/27/2017     Lab Results   Component Value Date    LDL 55 11/27/2017     Lab Results   Component Value Date    TRIG 150 11/27/2017     Lab Results   Component Value Date    CHOLHDLRATIO 2.8 09/28/2015       LAST BMP:  Lab Results   Component Value Date     02/17/2017      Lab Results   Component Value Date    POTASSIUM 4.5 02/17/2017     Lab Results   Component Value Date    CHLORIDE 102 02/17/2017     Lab Results   Component Value Date    DEENA 9.2 02/17/2017     Lab Results   Component Value Date    CO2 26 02/17/2017     Lab Results   Component Value Date    BUN 24.8 02/17/2017    BUN 25 02/17/2017     Lab Results   Component Value Date    CR 1.01 02/17/2017     Lab Results   Component Value Date    GLC 88 02/17/2017       LAST CBC:  Lab Results   Component Value Date    WBC 6.9 04/09/2015     Lab Results   Component Value Date    RBC 4.71 04/09/2015     Lab Results   Component Value Date    HGB 15.1 04/09/2015     Lab Results   Component Value Date    HCT 44.4 04/09/2015     Lab Results   Component Value Date    MCV 98 04/01/2011     Lab Results   Component Value Date    MCH 34.0 04/01/2011     Lab Results   Component Value Date    MCHC 34.6 04/01/2011     Lab Results   Component Value Date    RDW 12.6 04/01/2011     Lab Results   Component Value Date     04/09/2015         ZACHARY HERNÁNDEZ, APRN, CNP

## 2018-04-30 DIAGNOSIS — I10 BENIGN ESSENTIAL HYPERTENSION: ICD-10-CM

## 2018-04-30 RX ORDER — AMLODIPINE BESYLATE 5 MG/1
5 TABLET ORAL DAILY
Qty: 90 TABLET | Refills: 1 | Status: SHIPPED | OUTPATIENT
Start: 2018-04-30 | End: 2018-10-30

## 2018-04-30 RX ORDER — AMLODIPINE BESYLATE 5 MG/1
5 TABLET ORAL DAILY
Qty: 90 TABLET | Refills: 1 | Status: SHIPPED | OUTPATIENT
Start: 2018-04-30 | End: 2018-04-30

## 2018-07-03 DIAGNOSIS — I10 ESSENTIAL HYPERTENSION, BENIGN: ICD-10-CM

## 2018-07-03 RX ORDER — METOPROLOL TARTRATE 25 MG/1
25 TABLET, FILM COATED ORAL 2 TIMES DAILY
Qty: 180 TABLET | Refills: 1 | Status: SHIPPED | OUTPATIENT
Start: 2018-07-03 | End: 2019-01-15

## 2018-07-03 NOTE — TELEPHONE ENCOUNTER
Patient called requesting Metoprolol tartrate 25 mg one tablet twice dailly refill. Refill e scribed to pharmacy,

## 2018-07-09 DIAGNOSIS — I10 BENIGN ESSENTIAL HYPERTENSION: ICD-10-CM

## 2018-07-09 RX ORDER — LISINOPRIL 10 MG/1
10 TABLET ORAL DAILY
Qty: 90 TABLET | Refills: 1 | Status: SHIPPED | OUTPATIENT
Start: 2018-07-09 | End: 2018-12-28

## 2018-07-09 NOTE — TELEPHONE ENCOUNTER
Received refill request for:  Lisinopril  Last OV was: 11/27/2017 with AIDAN Wu  Labs/EKG: last BMP 2/17/2017  F/U scheduled: orders in Epic for 11/2018  New script sent to: [facility]

## 2018-10-01 DIAGNOSIS — E78.5 HYPERLIPIDEMIA LDL GOAL <130: ICD-10-CM

## 2018-10-01 RX ORDER — ATORVASTATIN CALCIUM 80 MG/1
80 TABLET, FILM COATED ORAL DAILY
Qty: 90 TABLET | Refills: 3 | Status: SHIPPED | OUTPATIENT
Start: 2018-10-01 | End: 2019-09-30

## 2018-10-01 NOTE — TELEPHONE ENCOUNTER
Received refill request for:  Atorvastatin  Last OV was: 11/27/2017 with AIDAN Wu  Labs/EKG: last lipid 11/27/2017  F/U scheduled: 11/27/2018 with Dr. Guidry  New script sent to: [facility]

## 2018-10-26 LAB
ANION GAP SERPL CALCULATED.3IONS-SCNC: 10 MMOL/L
BUN SERPL-MCNC: 20 MG/DL
CALCIUM SERPL-MCNC: 9.5 MG/DL
CHLORIDE SERPLBLD-SCNC: 107 MMOL/L
CHOLEST SERPL-MCNC: 132 MG/DL
CO2 SERPL-SCNC: 24 MMOL/L
CREAT SERPL-MCNC: 0.89 MG/DL
GFR SERPL CREATININE-BSD FRML MDRD: >60 ML/MIN/1.73M2
GLUCOSE SERPL-MCNC: 114 MG/DL (ref 70–99)
HDLC SERPL-MCNC: 45 MG/DL
LDLC SERPL CALC-MCNC: 67 MG/DL
NONHDLC SERPL-MCNC: NORMAL MG/DL
POTASSIUM SERPL-SCNC: 4.3 MMOL/L
SODIUM SERPL-SCNC: 141 MMOL/L
TRIGL SERPL-MCNC: 99 MG/DL

## 2018-10-30 DIAGNOSIS — I10 BENIGN ESSENTIAL HYPERTENSION: ICD-10-CM

## 2018-10-30 RX ORDER — AMLODIPINE BESYLATE 5 MG/1
5 TABLET ORAL DAILY
Qty: 90 TABLET | Refills: 0 | Status: SHIPPED | OUTPATIENT
Start: 2018-10-30 | End: 2019-01-30

## 2018-10-30 NOTE — TELEPHONE ENCOUNTER
Received refill request for:  Amlodipine  Last OV was: 11/27/2017 with AIDAN Wu  Labs/EKG: n/a  F/U scheduled: 11/27/2018 with Dr. Guidry  New script sent to: [facility]

## 2018-11-14 ENCOUNTER — PRE VISIT (OUTPATIENT)
Dept: CARDIOLOGY | Facility: CLINIC | Age: 78
End: 2018-11-14

## 2018-11-14 PROBLEM — E78.00 PURE HYPERCHOLESTEROLEMIA: Status: ACTIVE | Noted: 2018-11-14

## 2018-11-14 PROBLEM — I77.810 ASCENDING AORTA DILATION (H): Status: ACTIVE | Noted: 2018-11-14

## 2018-11-14 PROBLEM — I48.91 ATRIAL FIBRILLATION (H): Status: ACTIVE | Noted: 2018-11-14

## 2018-11-14 NOTE — TELEPHONE ENCOUNTER
Called patient to let him know that he does not need labs drawn at his OV 11/27/18 as a BMP & lipid panel were drawn by his PCP in October. Pt to keep echo appointment and OV that same day. Pt verbalized understanding.

## 2018-11-27 ENCOUNTER — HOSPITAL ENCOUNTER (OUTPATIENT)
Dept: CARDIOLOGY | Facility: CLINIC | Age: 78
Discharge: HOME | End: 2018-11-27
Attending: NURSE PRACTITIONER | Admitting: NURSE PRACTITIONER
Payer: MEDICARE

## 2018-11-27 ENCOUNTER — OFFICE VISIT (OUTPATIENT)
Dept: CARDIOLOGY | Facility: CLINIC | Age: 78
End: 2018-11-27
Attending: INTERNAL MEDICINE
Payer: COMMERCIAL

## 2018-11-27 VITALS
DIASTOLIC BLOOD PRESSURE: 70 MMHG | BODY MASS INDEX: 27.58 KG/M2 | HEIGHT: 71 IN | SYSTOLIC BLOOD PRESSURE: 137 MMHG | WEIGHT: 197 LBS | HEART RATE: 75 BPM

## 2018-11-27 DIAGNOSIS — I25.10 CORONARY ARTERY DISEASE INVOLVING NATIVE CORONARY ARTERY OF NATIVE HEART WITHOUT ANGINA PECTORIS: ICD-10-CM

## 2018-11-27 DIAGNOSIS — I71.21 ASCENDING AORTIC ANEURYSM (H): ICD-10-CM

## 2018-11-27 DIAGNOSIS — I77.810 ASCENDING AORTA DILATION (H): Primary | ICD-10-CM

## 2018-11-27 DIAGNOSIS — I10 BENIGN ESSENTIAL HYPERTENSION: ICD-10-CM

## 2018-11-27 PROCEDURE — 93306 TTE W/DOPPLER COMPLETE: CPT | Mod: 26 | Performed by: INTERNAL MEDICINE

## 2018-11-27 PROCEDURE — 40000264 ECHO COMPLETE WITH OPTISON

## 2018-11-27 PROCEDURE — 99214 OFFICE O/P EST MOD 30 MIN: CPT | Performed by: PHYSICIAN ASSISTANT

## 2018-11-27 PROCEDURE — 25500064 ZZH RX 255 OP 636: Performed by: NURSE PRACTITIONER

## 2018-11-27 RX ADMIN — HUMAN ALBUMIN MICROSPHERES AND PERFLUTREN 3 ML: 10; .22 INJECTION, SOLUTION INTRAVENOUS at 10:40

## 2018-11-27 NOTE — PROGRESS NOTES
"Cardiology Clinic Progress Note    Saul Garcia MRN# [id number]   YOB: 1940 Age: 78 year old          Assessment and Plan:     In summary, Saul Garcia presents today for a routine f/u visit.     1. Moderate ascending aortic aneurysm - with mild progression from 4.0 cm to 4.4 cm over the past five years. No significant AI. Asymptomatic.     2. CAD, s/p CABG x4 in 2011 - on asa and high-intensity statin. Exercises routinely with symptoms.     3. Post-op AF without documented recurrence and history of isolated palpitations unchanged x years - remains in SR on exam today.    4. HTN - borderline-elevated in clinic today, with acceptable home readings.    5. HLP - very well-controlled with an LDL < 70 and HDL > 40 as of 10/2018.     6. Overweight - Body mass index is 27.49 kg/(m^2).    Plan:  - MRA to reassess TAA in one year, F/U with Dr. Guidry afterward  - In the meantime, he was advised call with sustained SBP's > 135 mmHg  - Counseled re: routine aerobic exercise (with avoidance of routine weight lifting), a mediterranean-style and low salt diet diet with no more than 2 alcoholic beverages / day for weight loss and heart health  - Could consider routine stress test in 2-3 years (10 years post-bypass)         History of Presenting Illness:      Saul Garcia is a pleasant 78 year old patient of  Dr. Guidry who presents today for a routine f/u visit.    The patient has a history of CAD s/p CABG x4 in 2011 (LIMA to LAD, SVG to OM, sequential SVG to first second diagonal), post-op AF following bypass without noted recurrence, HTN, HLP, and ascending aortic aneurysm.     Today, he reports feeling very well. He denies chest pain, shortness of breath, PND, orthopnea, edema, near syncope or syncope. He continues to note mild palpitations in the morning (\"skipped beats\" here and there) after waking while lying in bed in the mornings and resolved after getting up and moving, this is unchanged over " "the past years. He denies hx of strokes or TIA's. He checks his BP routinely at home and obtains mid-130's/70's. He stays active - typically with routine stretching for his chronic back and knee pain, but does bike daily while residing in [place] and has no symptoms suggestive of angina with this. He doesn't lift weights > 20 lbs. He travels to [place] with his wife for three months out of the winter.  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 He drives about three hours to get to his clinic visits but is able to visit with his children and grandchildren who lives in the area when he does come. He drinks typically two beers before dinner and has 1 - 2 glasses of wine with dinner / day. He does not smoke.     TTE today shows an ascending aortic aneurysm which has progressed mildly from 4.0 cm in 2013 to 4.4 cm. There is trace AI, mild MR and mild TR. He had a lipid panel drawn at the end of October 2018 which is very satisfactory showing an LDL of 67, HDL 45, .           Review of Systems:     12-pt ROS is negative except for as noted in the HPI.           Physical Exam:     Vitals: Ht 1.803 m (5' 10.98\")  BMI 26.79 kg/m2  Wt Readings from Last 3 Encounters:   11/27/17 87.1 kg (192 lb)   01/27/17 86.6 kg (191 lb)   11/21/16 88.4 kg (194 lb 14.4 oz)       Constitutional:  Patient is pleasant, alert, cooperative, and in NAD.  HEENT:  NCAT. PERRLA. EOM's intact.   Neck:  CVP appears normal. No carotid bruits.   Pulmonary: Normal respiratory effort. CTAB.   Cardiac: RRR, normal S1/S2, no S3/S4, no murmur or rub.   Vascular: Pulses in the upper and lower extremities are 2+ and equal bilaterally.  Extremities: No edema, erythema, cyanosis or tenderness appreciated.  Psych: Appropriate affect.        Data:     Cardiac Diagnostics reviewed:  Type Date Result   TTE 11/27/18 1. The left ventricle is normal in structure, function and size. The " visual  ejection fraction is estimated at 60%.  2. The right ventricle is normal in structure, function and size.  3. No valve disease.  4. The ascending aorta is Moderately dilated (4.4cm).     Echo from 10/2013 showed ascending aorta 4.0cm, no other changes.     Recent Labs   Lab Test 10/26/18  11/27/17   0740  11/21/16   0932  04/09/15   03/14/11   1021   LDL  67  55  58   < >  75  75   < >  112   HDL  45  39*  54   < >  44  44   < >  48   NHDL   --   85  79   --    --    --    --    CHOL  132  124  133   < >  131  131   < >  184   TRIG  99  150*  107   < >  60  60   < >  121   TSH   --    --    --    --   1.60  1.50   --   1.66    < > = values in this interval not displayed.       Lab Results   Component Value Date    WBC 6.9 04/09/2015    RBC 4.71 04/09/2015    HGB 15.1 04/09/2015    HCT 44.4 04/09/2015    MCV 98 04/01/2011    MCH 34.0 (H) 04/01/2011    MCHC 34.6 04/01/2011    RDW 12.6 04/01/2011     04/09/2015       Lab Results   Component Value Date     10/26/2018    POTASSIUM 4.3 10/26/2018    CHLORIDE 107 10/26/2018    CO2 24 10/26/2018    ANIONGAP 10 10/26/2018     (A) 10/26/2018    BUN 20 10/26/2018    CR 0.89 10/26/2018    GFRESTIMATED >60 10/26/2018    GFRESTBLACK 83 02/17/2017    DEENA 9.5 10/26/2018      Lab Results   Component Value Date    AST 21 07/08/2015    ALT 22 07/08/2015       Lab Results   Component Value Date    A1C 5.4 03/28/2011       Lab Results   Component Value Date    INR 1.36 (H) 03/29/2011    INR 1.43 (H) 03/29/2011          Problem List:     Patient Active Problem List   Diagnosis     Essential hypertension, benign     Benign localized hyperplasia of prostate without urinary obstruction and other lower urinary tract symptoms (LUTS)     HYPERLIPIDEMIA LDL GOAL <130     Coronary artery disease involving native coronary artery of native heart without angina pectoris     Personal history of other malignant neoplasm of skin     AK (actinic keratosis)     Ascending  aorta dilation (H)     Atrial fibrillation (H)     Pure hypercholesterolemia            Medications:     Current Outpatient Prescriptions   Medication Sig Dispense Refill     amLODIPine (NORVASC) 5 MG tablet Take 1 tablet (5 mg) by mouth daily 90 tablet 0     aspirin 81 MG tablet Take 1 tablet by mouth daily.  3     atorvastatin (LIPITOR) 80 MG tablet Take 1 tablet (80 mg) by mouth daily 90 tablet 3     Cholecalciferol (VITAMIN D) 2000 UNITS tablet Take 1 tablet by mouth 2 times daily       ibuprofen 200 MG capsule Take 200 mg by mouth every 4 hours as needed for fever       lisinopril (PRINIVIL/ZESTRIL) 10 MG tablet Take 1 tablet (10 mg) by mouth daily 90 tablet 1     metoprolol tartrate (LOPRESSOR) 25 MG tablet Take 1 tablet (25 mg) by mouth 2 times daily 180 tablet 1     Multiple Vitamins-Minerals (PRESERVISION AREDS) TABS        nitroglycerin (NITROSTAT) 0.4 MG SL tablet Place 1 tablet (0.4 mg) under the tongue every 5 minutes as needed for chest pain 25 tablet 4          Past Medical History:     Past Medical History:   Diagnosis Date      BPH      Aortic root dilatation (H)     3.7 by echo 10/13     Atrial fibrillation (H)     Post op     Basal cell carcinoma      CAD (coronary artery disease)     CABG 2011: LIMA to LAD, sequential SVG to D1 and D2, SVG to OM     Essential hypertension, benign      Pure hypercholesterolemia      Past Surgical History:   Procedure Laterality Date     BIOPSY OF SKIN LESION       CORONARY ARTERY BYPASS  march 2011     HC REMOVE TONSILS/ADENOIDS,<11 Y/O  Childhood    T & A <13y.o.     ROTATOR CUFF REPAIR RT/LT  2007    rt shoulder     SURGICAL HISTORY OF -   2/28/01    Lt. inguinal hernia repair     SURGICAL HISTORY OF -   6/88    Orchiectomy, seminoma     SURGICAL HISTORY OF -   2008    rt knee repair     Family History   Problem Relation Age of Onset     Arthritis Mother      Eye Disorder Mother      mac.degen.     Neurologic Disorder Mother      Dementia     HEART DISEASE  Father      chf     Social History     Social History     Marital status:      Spouse name: JACQUELINE     Number of children: 2     Years of education: N/A     Occupational History      Retired     Social History Main Topics     Smoking status: Never Smoker     Smokeless tobacco: Never Used     Alcohol use Yes      Comment: 15/week     Drug use: No     Sexual activity: Yes     Other Topics Concern     Caffeine Concern No     no caffeine     Sleep Concern No     Stress Concern Yes     Weight Concern No     Special Diet Yes     low sodium     Exercise Yes     biking, walking     Social History Narrative            Allergies:   Review of patient's allergies indicates no known allergies.      Tea Lubin PA-C  [facility]  Pager: [phone]

## 2018-11-27 NOTE — PATIENT INSTRUCTIONS
Today's Plan:   - Your cholesterol looks great today. Continue a heart-healthy, mediterranean-style diet and routine cardiovascular exercise for heart health and weight loss.   - Try to limit your alcohol intake to two drinks/day or less.   - Call us if you experience chest discomfort or worsening palpitations.   - Call us if you start to obtain blood pressure readings > 135 mmHg consistently.   - We'll check an MRI of your heart in one year, and you'll return to see Dr. Guidry after that.    If you have questions or concerns please call my nurse team at [phone].    Scheduling phone number: [phone]  Reminder: Please bring in all current medications, over the counter supplements and vitamin bottles to your next appointment.    It was a pleasure seeing you today!     Tea Lubin PA-C

## 2018-11-27 NOTE — LETTER
11/27/2018    [facility]  [address] [address] [address]    RE: Saul Garcia       Dear Colleague,    I had the pleasure of seeing Saul Garcia in the [facility].    Cardiology Clinic Progress Note    Saul Garcia MRN# [id number]   YOB: 1940 Age: 78 year old          Assessment and Plan:     In summary, Saul Garcia presents today for a routine f/u visit.     1. Moderate ascending aortic aneurysm - with mild progression from 4.0 cm to 4.4 cm over the past five years. No significant AI. Asymptomatic.     2. CAD, s/p CABG x4 in 2011 - on asa and high-intensity statin. Exercises routinely with symptoms.     3. Post-op AF without documented recurrence and history of isolated palpitations unchanged x years - remains in SR on exam today.    4. HTN - borderline-elevated in clinic today, with acceptable home readings.    5. HLP - very well-controlled with an LDL < 70 and HDL > 40 as of 10/2018.     6. Overweight - Body mass index is 27.49 kg/(m^2).    Plan:  - MRA to reassess TAA in one year, F/U with Dr. Guidry afterward  - In the meantime, he was advised call with sustained SBP's > 135 mmHg  - Counseled re: routine aerobic exercise (with avoidance of routine weight lifting), a mediterranean-style and low salt diet diet with no more than 2 alcoholic beverages / day for weight loss and heart health  - Could consider routine stress test in 2-3 years (10 years post-bypass)         History of Presenting Illness:      Saul Garcia is a pleasant 78 year old patient of  Dr. Guidry who presents today for a routine f/u visit.    The patient has a history of CAD s/p CABG x4 in 2011 (LIMA to LAD, SVG to OM, sequential SVG to first second diagonal), post-op AF following bypass without noted recurrence, HTN, HLP, and ascending aortic aneurysm.     Today, he reports feeling very well. He denies chest pain, shortness of breath, PND, orthopnea,  "edema, near syncope or syncope. He continues to note mild palpitations in the morning (\"skipped beats\" here and there) after waking while lying in bed in the mornings and resolved after getting up and moving, this is unchanged over the past years. He denies hx of strokes or TIA's. He checks his BP routinely at home and obtains mid-130's/70's. He stays active - typically with routine stretching for his chronic back and knee pain, but does bike daily while residing in [place] and has no symptoms suggestive of angina with this. He doesn't lift weights > 20 lbs. He travels to [place] with his wife for three months out of the winter.   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 He drives about three hours to get to his clinic visits but is able to visit with his children and grandchildren who lives in the area when he does come. He drinks typically two beers before dinner and has 1 - 2 glasses of wine with dinner / day. He does not smoke.     TTE today shows an ascending aortic aneurysm which has progressed mildly from 4.0 cm in 2013 to 4.4 cm. There is trace AI, mild MR and mild TR. He had a lipid panel drawn at the end of October 2018 which is very satisfactory showing an LDL of 67, HDL 45, .           Review of Systems:     12-pt ROS is negative except for as noted in the HPI.           Physical Exam:     Vitals: Ht 1.803 m (5' 10.98\")  BMI 26.79 kg/m2  Wt Readings from Last 3 Encounters:   11/27/17 87.1 kg (192 lb)   01/27/17 86.6 kg (191 lb)   11/21/16 88.4 kg (194 lb 14.4 oz)       Constitutional:  Patient is pleasant, alert, cooperative, and in NAD.  HEENT:  NCAT. PERRLA. EOM's intact.   Neck:  CVP appears normal. No carotid bruits.   Pulmonary: Normal respiratory effort. CTAB.   Cardiac: RRR, normal S1/S2, no S3/S4, no murmur or rub.   Vascular: Pulses in the upper and lower extremities are 2+ and equal bilaterally.  Extremities: No " edema, erythema, cyanosis or tenderness appreciated.  Psych: Appropriate affect.        Data:     Cardiac Diagnostics reviewed:  Type Date Result   TTE 11/27/18 1. The left ventricle is normal in structure, function and size. The visual  ejection fraction is estimated at 60%.  2. The right ventricle is normal in structure, function and size.  3. No valve disease.  4. The ascending aorta is Moderately dilated (4.4cm).     Echo from 10/2013 showed ascending aorta 4.0cm, no other changes.     Recent Labs   Lab Test 10/26/18  11/27/17   0740  11/21/16   0932  04/09/15   03/14/11   1021   LDL  67  55  58   < >  75  75   < >  112   HDL  45  39*  54   < >  44  44   < >  48   NHDL   --   85  79   --    --    --    --    CHOL  132  124  133   < >  131  131   < >  184   TRIG  99  150*  107   < >  60  60   < >  121   TSH   --    --    --    --   1.60  1.50   --   1.66    < > = values in this interval not displayed.       Lab Results   Component Value Date    WBC 6.9 04/09/2015    RBC 4.71 04/09/2015    HGB 15.1 04/09/2015    HCT 44.4 04/09/2015    MCV 98 04/01/2011    MCH 34.0 (H) 04/01/2011    MCHC 34.6 04/01/2011    RDW 12.6 04/01/2011     04/09/2015       Lab Results   Component Value Date     10/26/2018    POTASSIUM 4.3 10/26/2018    CHLORIDE 107 10/26/2018    CO2 24 10/26/2018    ANIONGAP 10 10/26/2018     (A) 10/26/2018    BUN 20 10/26/2018    CR 0.89 10/26/2018    GFRESTIMATED >60 10/26/2018    GFRESTBLACK 83 02/17/2017    DEENA 9.5 10/26/2018      Lab Results   Component Value Date    AST 21 07/08/2015    ALT 22 07/08/2015       Lab Results   Component Value Date    A1C 5.4 03/28/2011       Lab Results   Component Value Date    INR 1.36 (H) 03/29/2011    INR 1.43 (H) 03/29/2011          Problem List:     Patient Active Problem List   Diagnosis     Essential hypertension, benign     Benign localized hyperplasia of prostate without urinary obstruction and other lower urinary tract symptoms (LUTS)      HYPERLIPIDEMIA LDL GOAL <130     Coronary artery disease involving native coronary artery of native heart without angina pectoris     Personal history of other malignant neoplasm of skin     AK (actinic keratosis)     Ascending aorta dilation (H)     Atrial fibrillation (H)     Pure hypercholesterolemia            Medications:     Current Outpatient Prescriptions   Medication Sig Dispense Refill     amLODIPine (NORVASC) 5 MG tablet Take 1 tablet (5 mg) by mouth daily 90 tablet 0     aspirin 81 MG tablet Take 1 tablet by mouth daily.  3     atorvastatin (LIPITOR) 80 MG tablet Take 1 tablet (80 mg) by mouth daily 90 tablet 3     Cholecalciferol (VITAMIN D) 2000 UNITS tablet Take 1 tablet by mouth 2 times daily       ibuprofen 200 MG capsule Take 200 mg by mouth every 4 hours as needed for fever       lisinopril (PRINIVIL/ZESTRIL) 10 MG tablet Take 1 tablet (10 mg) by mouth daily 90 tablet 1     metoprolol tartrate (LOPRESSOR) 25 MG tablet Take 1 tablet (25 mg) by mouth 2 times daily 180 tablet 1     Multiple Vitamins-Minerals (PRESERVISION AREDS) TABS        nitroglycerin (NITROSTAT) 0.4 MG SL tablet Place 1 tablet (0.4 mg) under the tongue every 5 minutes as needed for chest pain 25 tablet 4          Past Medical History:     Past Medical History:   Diagnosis Date      BPH      Aortic root dilatation (H)     3.7 by echo 10/13     Atrial fibrillation (H)     Post op     Basal cell carcinoma      CAD (coronary artery disease)     CABG 2011: LIMA to LAD, sequential SVG to D1 and D2, SVG to OM     Essential hypertension, benign      Pure hypercholesterolemia      Past Surgical History:   Procedure Laterality Date     BIOPSY OF SKIN LESION       CORONARY ARTERY BYPASS  march 2011     HC REMOVE TONSILS/ADENOIDS,<11 Y/O  Childhood    T & A <13y.o.     ROTATOR CUFF REPAIR RT/LT  2007    rt shoulder     SURGICAL HISTORY OF -   2/28/01    Lt. inguinal hernia repair     SURGICAL HISTORY OF -   6/88    Orchiectomy, seminoma      SURGICAL HISTORY OF -   2008    rt knee repair     Family History   Problem Relation Age of Onset     Arthritis Mother      Eye Disorder Mother      mac.degen.     Neurologic Disorder Mother      Dementia     HEART DISEASE Father      chf     Social History     Social History     Marital status:      Spouse name: JACQUELINE     Number of children: 2     Years of education: N/A     Occupational History      Retired     Social History Main Topics     Smoking status: Never Smoker     Smokeless tobacco: Never Used     Alcohol use Yes      Comment: 15/week     Drug use: No     Sexual activity: Yes     Other Topics Concern     Caffeine Concern No     no caffeine     Sleep Concern No     Stress Concern Yes     Weight Concern No     Special Diet Yes     low sodium     Exercise Yes     biking, walking     Social History Narrative            Allergies:   Review of patient's allergies indicates no known allergies.      Tea Lubin PA-C  [facility]  Pager: [phone]      Thank you for allowing me to participate in the care of your patient.    Sincerely,     Tea Lubin PA-C     [facility]

## 2018-11-27 NOTE — MR AVS SNAPSHOT
After Visit Summary   11/27/2018    Saul Garcia    MRN: [id number]           Patient Information     Date Of Birth          1940        Visit Information        Provider Department      11/27/2018 2:30 PM Tea Lubin PA-C [facility]        Today's Diagnoses     Ascending aorta dilation (H)    -  1    Coronary artery disease involving native coronary artery of native heart without angina pectoris          Care Instructions    Today's Plan:   - Your cholesterol looks great today. Continue a heart-healthy, mediterranean-style diet and routine cardiovascular exercise for heart health and weight loss.   - Try to limit your alcohol intake to two drinks/day or less.   - Call us if you experience chest discomfort or worsening palpitations.   - Call us if you start to obtain blood pressure readings > 135 mmHg consistently.   - We'll check an MRI of your heart in one year, and you'll return to see Dr. Guidry after that.    If you have questions or concerns please call my nurse team at [phone].    Scheduling phone number: [phone]  Reminder: Please bring in all current medications, over the counter supplements and vitamin bottles to your next appointment.    It was a pleasure seeing you today!     Tea Lubin PA-C              Follow-ups after your visit        Additional Services     Follow-Up with Cardiologist                 Future tests that were ordered for you today     Open Future Orders        Priority Expected Expires Ordered    MRA Chest wo & w Contrast Routine 11/27/2019 11/27/2019 11/27/2018    Follow-Up with Cardiologist Routine 11/27/2019 11/28/2019 11/27/2018    ECHO COMPLETE WITH [facility] Routine 11/27/2018 11/28/2018 11/27/2017            Who to contact     If you have questions or need follow up information about today's clinic visit or your schedule please contact [facility] directly  "at [phone].  Normal or non-critical lab and imaging results will be communicated to you by MyChart, letter or phone within 4 business days after the clinic has received the results. If you do not hear from us within 7 days, please contact the clinic through MyChart or phone. If you have a critical or abnormal lab result, we will notify you by phone as soon as possible.  Submit refill requests through [employer] or call your pharmacy and they will forward the refill request to us. Please allow 3 business days for your refill to be completed.          Additional Information About Your Visit        Care EveryWhere ID     This is your Care EveryWhere ID. This could be used by other organizations to access your [facility] medical records  [id number]        Your Vitals Were     Pulse Height BMI (Body Mass Index)             75 1.803 m (5' 10.98\") 27.49 kg/m2          Blood Pressure from Last 3 Encounters:   11/27/18 137/70   11/27/17 112/60   01/27/17 150/82    Weight from Last 3 Encounters:   11/27/18 89.4 kg (197 lb)   11/27/17 87.1 kg (192 lb)   01/27/17 86.6 kg (191 lb)              We Performed the Following     Follow-Up with Cardiologist        Primary Care Provider Office Phone # Fax #    [facility] [phone] [address] [address] [address]        Equal Access to Services     PILAR LAYNE AH: Hadii brigitte dempseyo Soabdirahman, waaxda luqadaha, qaybta kaalmada briseyda gibbs . So [facility] [phone].    ATENCIÓN: Si habla español, tiene a benitez disposición servicios gratuitos de asistencia lingüística. Llame al [phone].    We comply with applicable federal civil rights laws and [place] laws. We do not discriminate on the basis of race, color, national origin, age, disability, sex, sexual orientation, or gender identity.            Thank you!     Thank you for choosing [facility]  for your care. " Our goal is always to provide you with excellent care. Hearing back from our patients is one way we can continue to improve our services. Please take a few minutes to complete the written survey that you may receive in the mail after your visit with us. Thank you!             Your Updated Medication List - Protect others around you: Learn how to safely use, store and throw away your medicines at www.disposemymeds.org.          This list is accurate as of 11/27/18  2:55 PM.  Always use your most recent med list.                   Brand Name Dispense Instructions for use Diagnosis    amLODIPine 5 MG tablet    NORVASC    90 tablet    Take 1 tablet (5 mg) by mouth daily    Benign essential hypertension       aspirin 81 MG tablet    ASA     Take 1 tablet by mouth daily.    Essential hypertension, benign       atorvastatin 80 MG tablet    LIPITOR    90 tablet    Take 1 tablet (80 mg) by mouth daily    Hyperlipidemia LDL goal <130       ibuprofen 200 MG capsule    ADVIL/MOTRIN     Take 200 mg by mouth every 4 hours as needed for fever        lisinopril 10 MG tablet    PRINIVIL/ZESTRIL    90 tablet    Take 1 tablet (10 mg) by mouth daily    Benign essential hypertension       metoprolol tartrate 25 MG tablet    LOPRESSOR    180 tablet    Take 1 tablet (25 mg) by mouth 2 times daily    Essential hypertension, benign       nitroGLYcerin 0.4 MG sublingual tablet    NITROSTAT    25 tablet    Place 1 tablet (0.4 mg) under the tongue every 5 minutes as needed for chest pain    CAD (coronary artery disease)       PRESERVISION AREDS Tabs           vitamin D3 2000 units tablet    CHOLECALCIFEROL     Take 1 tablet by mouth 2 times daily

## 2018-11-27 NOTE — LETTER
11/27/2018    [facility]  [address] [address] [address]    RE: Saul Garcia       Dear Colleague,    I had the pleasure of seeing Saul Garcia in the [facility].    Cardiology Clinic Progress Note    Saul Garcia MRN# [id number]   YOB: 1940 Age: 78 year old          Assessment and Plan:     In summary, Saul Garcia presents today for a routine f/u visit.     1. Moderate ascending aortic aneurysm - with mild progression from 4.0 cm to 4.4 cm over the past five years. No significant AI. Asymptomatic.     2. CAD, s/p CABG x4 in 2011 - on asa and high-intensity statin. Exercises routinely with symptoms.     3. Post-op AF without documented recurrence and history of isolated palpitations unchanged x years - remains in SR on exam today.    4. HTN - borderline-elevated in clinic today, with acceptable home readings.    5. HLP - very well-controlled with an LDL < 70 and HDL > 40 as of 10/2018.     6. Overweight - Body mass index is 27.49 kg/(m^2).    Plan:  - MRA to reassess TAA in one year, F/U with Dr. Guidry afterward  - In the meantime, he was advised call with sustained SBP's > 135 mmHg  - Counseled re: routine aerobic exercise (with avoidance of routine weight lifting), a mediterranean-style and low salt diet diet with no more than 2 alcoholic beverages / day for weight loss and heart health  - Could consider routine stress test in 2-3 years (10 years post-bypass)         History of Presenting Illness:      Saul Garcia is a pleasant 78 year old patient of  Dr. Guidry who presents today for a routine f/u visit.    The patient has a history of CAD s/p CABG x4 in 2011 (LIMA to LAD, SVG to OM, sequential SVG to first second diagonal), post-op AF following bypass without noted recurrence, HTN, HLP, and ascending aortic aneurysm.     Today, he reports feeling very well. He denies chest pain, shortness of breath, PND, orthopnea,  "edema, near syncope or syncope. He continues to note mild palpitations in the morning (\"skipped beats\" here and there) after waking while lying in bed in the mornings and resolved after getting up and moving, this is unchanged over the past years. He denies hx of strokes or TIA's. He checks his BP routinely at home and obtains mid-130's/70's. He stays active - typically with routine stretching for his chronic back and knee pain, but does bike daily while residing in [place] and has no symptoms suggestive of angina with this. He doesn't lift weights > 20 lbs. He travels to [place] with his wife for three months out of the winter.   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 He drives about three hours to get to his clinic visits but is able to visit with his children and grandchildren who lives in the area when he does come. He drinks typically two beers before dinner and has 1 - 2 glasses of wine with dinner / day. He does not smoke.     TTE today shows an ascending aortic aneurysm which has progressed mildly from 4.0 cm in 2013 to 4.4 cm. There is trace AI, mild MR and mild TR. He had a lipid panel drawn at the end of October 2018 which is very satisfactory showing an LDL of 67, HDL 45, .           Review of Systems:     12-pt ROS is negative except for as noted in the HPI.           Physical Exam:     Vitals: Ht 1.803 m (5' 10.98\")  BMI 26.79 kg/m2  Wt Readings from Last 3 Encounters:   11/27/17 87.1 kg (192 lb)   01/27/17 86.6 kg (191 lb)   11/21/16 88.4 kg (194 lb 14.4 oz)       Constitutional:  Patient is pleasant, alert, cooperative, and in NAD.  HEENT:  NCAT. PERRLA. EOM's intact.   Neck:  CVP appears normal. No carotid bruits.   Pulmonary: Normal respiratory effort. CTAB.   Cardiac: RRR, normal S1/S2, no S3/S4, no murmur or rub.   Vascular: Pulses in the upper and lower extremities are 2+ and equal bilaterally.  Extremities: No " edema, erythema, cyanosis or tenderness appreciated.  Psych: Appropriate affect.        Data:     Cardiac Diagnostics reviewed:  Type Date Result   TTE 11/27/18 1. The left ventricle is normal in structure, function and size. The visual  ejection fraction is estimated at 60%.  2. The right ventricle is normal in structure, function and size.  3. No valve disease.  4. The ascending aorta is Moderately dilated (4.4cm).     Echo from 10/2013 showed ascending aorta 4.0cm, no other changes.     Recent Labs   Lab Test 10/26/18  11/27/17   0740  11/21/16   0932  04/09/15   03/14/11   1021   LDL  67  55  58   < >  75  75   < >  112   HDL  45  39*  54   < >  44  44   < >  48   NHDL   --   85  79   --    --    --    --    CHOL  132  124  133   < >  131  131   < >  184   TRIG  99  150*  107   < >  60  60   < >  121   TSH   --    --    --    --   1.60  1.50   --   1.66    < > = values in this interval not displayed.       Lab Results   Component Value Date    WBC 6.9 04/09/2015    RBC 4.71 04/09/2015    HGB 15.1 04/09/2015    HCT 44.4 04/09/2015    MCV 98 04/01/2011    MCH 34.0 (H) 04/01/2011    MCHC 34.6 04/01/2011    RDW 12.6 04/01/2011     04/09/2015       Lab Results   Component Value Date     10/26/2018    POTASSIUM 4.3 10/26/2018    CHLORIDE 107 10/26/2018    CO2 24 10/26/2018    ANIONGAP 10 10/26/2018     (A) 10/26/2018    BUN 20 10/26/2018    CR 0.89 10/26/2018    GFRESTIMATED >60 10/26/2018    GFRESTBLACK 83 02/17/2017    DEENA 9.5 10/26/2018      Lab Results   Component Value Date    AST 21 07/08/2015    ALT 22 07/08/2015       Lab Results   Component Value Date    A1C 5.4 03/28/2011       Lab Results   Component Value Date    INR 1.36 (H) 03/29/2011    INR 1.43 (H) 03/29/2011          Problem List:     Patient Active Problem List   Diagnosis     Essential hypertension, benign     Benign localized hyperplasia of prostate without urinary obstruction and other lower urinary tract symptoms (LUTS)      HYPERLIPIDEMIA LDL GOAL <130     Coronary artery disease involving native coronary artery of native heart without angina pectoris     Personal history of other malignant neoplasm of skin     AK (actinic keratosis)     Ascending aorta dilation (H)     Atrial fibrillation (H)     Pure hypercholesterolemia            Medications:     Current Outpatient Prescriptions   Medication Sig Dispense Refill     amLODIPine (NORVASC) 5 MG tablet Take 1 tablet (5 mg) by mouth daily 90 tablet 0     aspirin 81 MG tablet Take 1 tablet by mouth daily.  3     atorvastatin (LIPITOR) 80 MG tablet Take 1 tablet (80 mg) by mouth daily 90 tablet 3     Cholecalciferol (VITAMIN D) 2000 UNITS tablet Take 1 tablet by mouth 2 times daily       ibuprofen 200 MG capsule Take 200 mg by mouth every 4 hours as needed for fever       lisinopril (PRINIVIL/ZESTRIL) 10 MG tablet Take 1 tablet (10 mg) by mouth daily 90 tablet 1     metoprolol tartrate (LOPRESSOR) 25 MG tablet Take 1 tablet (25 mg) by mouth 2 times daily 180 tablet 1     Multiple Vitamins-Minerals (PRESERVISION AREDS) TABS        nitroglycerin (NITROSTAT) 0.4 MG SL tablet Place 1 tablet (0.4 mg) under the tongue every 5 minutes as needed for chest pain 25 tablet 4          Past Medical History:     Past Medical History:   Diagnosis Date      BPH      Aortic root dilatation (H)     3.7 by echo 10/13     Atrial fibrillation (H)     Post op     Basal cell carcinoma      CAD (coronary artery disease)     CABG 2011: LIMA to LAD, sequential SVG to D1 and D2, SVG to OM     Essential hypertension, benign      Pure hypercholesterolemia      Past Surgical History:   Procedure Laterality Date     BIOPSY OF SKIN LESION       CORONARY ARTERY BYPASS  march 2011     HC REMOVE TONSILS/ADENOIDS,<11 Y/O  Childhood    T & A <13y.o.     ROTATOR CUFF REPAIR RT/LT  2007    rt shoulder     SURGICAL HISTORY OF -   2/28/01    Lt. inguinal hernia repair     SURGICAL HISTORY OF -   6/88    Orchiectomy, seminoma      SURGICAL HISTORY OF -   2008    rt knee repair     Family History   Problem Relation Age of Onset     Arthritis Mother      Eye Disorder Mother      mac.degen.     Neurologic Disorder Mother      Dementia     HEART DISEASE Father      chf     Social History     Social History     Marital status:      Spouse name: JACQUELINE     Number of children: 2     Years of education: N/A     Occupational History      Retired     Social History Main Topics     Smoking status: Never Smoker     Smokeless tobacco: Never Used     Alcohol use Yes      Comment: 15/week     Drug use: No     Sexual activity: Yes     Other Topics Concern     Caffeine Concern No     no caffeine     Sleep Concern No     Stress Concern Yes     Weight Concern No     Special Diet Yes     low sodium     Exercise Yes     biking, walking     Social History Narrative            Allergies:   Review of patient's allergies indicates no known allergies.      Tea Lubin PA-C  [facility]  Pager: [phone]      Thank you for allowing me to participate in the care of your patient.      Sincerely,     Tea Lubin PA-C     [facility]    cc:   Jacques Guidry MD  [address]  [place] [address]

## 2018-12-28 DIAGNOSIS — I10 BENIGN ESSENTIAL HYPERTENSION: ICD-10-CM

## 2018-12-28 RX ORDER — LISINOPRIL 10 MG/1
10 TABLET ORAL DAILY
Qty: 90 TABLET | Refills: 3 | Status: SHIPPED | OUTPATIENT
Start: 2018-12-28 | End: 2019-12-03

## 2019-01-15 DIAGNOSIS — I10 ESSENTIAL HYPERTENSION, BENIGN: ICD-10-CM

## 2019-01-15 RX ORDER — METOPROLOL TARTRATE 25 MG/1
25 TABLET, FILM COATED ORAL 2 TIMES DAILY
Qty: 180 TABLET | Refills: 0 | Status: SHIPPED | OUTPATIENT
Start: 2019-01-15 | End: 2019-04-15

## 2019-01-30 DIAGNOSIS — I10 BENIGN ESSENTIAL HYPERTENSION: ICD-10-CM

## 2019-01-30 RX ORDER — AMLODIPINE BESYLATE 5 MG/1
5 TABLET ORAL DAILY
Qty: 90 TABLET | Refills: 3 | Status: SHIPPED | OUTPATIENT
Start: 2019-01-30 | End: 2019-12-03

## 2019-04-15 DIAGNOSIS — I10 ESSENTIAL HYPERTENSION, BENIGN: ICD-10-CM

## 2019-04-15 RX ORDER — METOPROLOL TARTRATE 25 MG/1
25 TABLET, FILM COATED ORAL 2 TIMES DAILY
Qty: 180 TABLET | Refills: 2 | Status: SHIPPED | OUTPATIENT
Start: 2019-04-15 | End: 2019-12-03

## 2019-09-30 DIAGNOSIS — E78.5 HYPERLIPIDEMIA LDL GOAL <130: ICD-10-CM

## 2019-09-30 RX ORDER — ATORVASTATIN CALCIUM 80 MG/1
80 TABLET, FILM COATED ORAL DAILY
Qty: 90 TABLET | Refills: 0 | Status: SHIPPED | OUTPATIENT
Start: 2019-09-30 | End: 2019-12-03

## 2019-10-14 LAB
ANION GAP SERPL CALCULATED.3IONS-SCNC: 9 MMOL/L
BUN SERPL-MCNC: 18 MG/DL
CALCIUM SERPL-MCNC: 9.5 MG/DL
CHLORIDE SERPLBLD-SCNC: 105 MMOL/L
CHOLEST SERPL-MCNC: 138 MG/DL
CO2 SERPL-SCNC: 26 MMOL/L
CREAT SERPL-MCNC: 1.08 MG/DL
GFR SERPL CREATININE-BSD FRML MDRD: >60 ML/MIN/1.73M2
GLUCOSE SERPL-MCNC: 107 MG/DL (ref 70–99)
HDLC SERPL-MCNC: 46 MG/DL
LDLC SERPL CALC-MCNC: 71 MG/DL
NONHDLC SERPL-MCNC: NORMAL MG/DL
POTASSIUM SERPL-SCNC: 4 MMOL/L
SODIUM SERPL-SCNC: 140 MMOL/L
TRIGL SERPL-MCNC: 107 MG/DL

## 2019-10-15 ENCOUNTER — TRANSFERRED RECORDS (OUTPATIENT)
Dept: HEALTH INFORMATION MANAGEMENT | Facility: CLINIC | Age: 79
End: 2019-10-15

## 2019-11-13 ENCOUNTER — PRE VISIT (OUTPATIENT)
Dept: CARDIOLOGY | Facility: CLINIC | Age: 79
End: 2019-11-13

## 2019-11-13 NOTE — TELEPHONE ENCOUNTER
Patient called message left regarding upcoming OV and labs. No recent labs done. Due for BMP and FLP's.  Message left to call back.

## 2019-11-18 NOTE — TELEPHONE ENCOUNTER
Spoke with patient, he just had his annual physical and labs at his PCP clinic. Request sent to [facility] in [place], [place].    11/19/19 received office note and labs from PCP for 10/14/19 visit. Copy sent to scan

## 2019-11-19 ENCOUNTER — TRANSFERRED RECORDS (OUTPATIENT)
Dept: HEALTH INFORMATION MANAGEMENT | Facility: CLINIC | Age: 79
End: 2019-11-19

## 2019-12-02 ENCOUNTER — HOSPITAL ENCOUNTER (OUTPATIENT)
Dept: CARDIOLOGY | Facility: CLINIC | Age: 79
Discharge: HOME | End: 2019-12-02
Attending: INTERNAL MEDICINE | Admitting: INTERNAL MEDICINE
Payer: MEDICARE

## 2019-12-02 DIAGNOSIS — I77.810 ASCENDING AORTA DILATION (H): ICD-10-CM

## 2019-12-02 DIAGNOSIS — I77.810 ASCENDING AORTA DILATION (H): Primary | ICD-10-CM

## 2019-12-02 PROCEDURE — 71555 MRI ANGIO CHEST W OR W/O DYE: CPT

## 2019-12-02 PROCEDURE — 25500064 ZZH RX 255 OP 636: Performed by: INTERNAL MEDICINE

## 2019-12-02 PROCEDURE — 71555 MRI ANGIO CHEST W OR W/O DYE: CPT | Mod: 26 | Performed by: INTERNAL MEDICINE

## 2019-12-02 PROCEDURE — A9585 GADOBUTROL INJECTION: HCPCS | Performed by: INTERNAL MEDICINE

## 2019-12-02 RX ORDER — ONDANSETRON 2 MG/ML
4 INJECTION INTRAMUSCULAR; INTRAVENOUS
Status: DISCONTINUED | OUTPATIENT
Start: 2019-12-02 | End: 2019-12-03 | Stop reason: HOSPADM

## 2019-12-02 RX ORDER — DIPHENHYDRAMINE HYDROCHLORIDE 50 MG/ML
25-50 INJECTION INTRAMUSCULAR; INTRAVENOUS
Status: DISCONTINUED | OUTPATIENT
Start: 2019-12-02 | End: 2019-12-03 | Stop reason: HOSPADM

## 2019-12-02 RX ORDER — GADOBUTROL 604.72 MG/ML
5-65 INJECTION INTRAVENOUS ONCE
Status: COMPLETED | OUTPATIENT
Start: 2019-12-02 | End: 2019-12-02

## 2019-12-02 RX ORDER — DIAZEPAM 5 MG
5 TABLET ORAL EVERY 30 MIN PRN
Status: DISCONTINUED | OUTPATIENT
Start: 2019-12-02 | End: 2019-12-03 | Stop reason: HOSPADM

## 2019-12-02 RX ORDER — DIPHENHYDRAMINE HCL 25 MG
25 CAPSULE ORAL
Status: DISCONTINUED | OUTPATIENT
Start: 2019-12-02 | End: 2019-12-03 | Stop reason: HOSPADM

## 2019-12-02 RX ORDER — METHYLPREDNISOLONE SODIUM SUCCINATE 125 MG/2ML
125 INJECTION, POWDER, LYOPHILIZED, FOR SOLUTION INTRAMUSCULAR; INTRAVENOUS
Status: DISCONTINUED | OUTPATIENT
Start: 2019-12-02 | End: 2019-12-03 | Stop reason: HOSPADM

## 2019-12-02 RX ORDER — ACYCLOVIR 200 MG/1
0-1 CAPSULE ORAL
Status: DISCONTINUED | OUTPATIENT
Start: 2019-12-02 | End: 2019-12-03 | Stop reason: HOSPADM

## 2019-12-02 RX ADMIN — GADOBUTROL 17 ML: 604.72 INJECTION INTRAVENOUS at 13:32

## 2019-12-03 ENCOUNTER — OFFICE VISIT (OUTPATIENT)
Dept: CARDIOLOGY | Facility: CLINIC | Age: 79
End: 2019-12-03
Payer: COMMERCIAL

## 2019-12-03 VITALS
WEIGHT: 197 LBS | DIASTOLIC BLOOD PRESSURE: 64 MMHG | HEART RATE: 59 BPM | HEIGHT: 71 IN | BODY MASS INDEX: 27.58 KG/M2 | SYSTOLIC BLOOD PRESSURE: 158 MMHG

## 2019-12-03 DIAGNOSIS — I25.10 CORONARY ARTERY DISEASE INVOLVING NATIVE CORONARY ARTERY OF NATIVE HEART WITHOUT ANGINA PECTORIS: Primary | Chronic | ICD-10-CM

## 2019-12-03 DIAGNOSIS — E78.00 PURE HYPERCHOLESTEROLEMIA: ICD-10-CM

## 2019-12-03 DIAGNOSIS — I10 ESSENTIAL HYPERTENSION, BENIGN: ICD-10-CM

## 2019-12-03 DIAGNOSIS — I77.810 ASCENDING AORTA DILATION (H): ICD-10-CM

## 2019-12-03 PROCEDURE — 99214 OFFICE O/P EST MOD 30 MIN: CPT | Performed by: INTERNAL MEDICINE

## 2019-12-03 RX ORDER — NITROGLYCERIN 0.4 MG/1
0.4 TABLET SUBLINGUAL EVERY 5 MIN PRN
Qty: 25 TABLET | Refills: 4 | Status: SHIPPED | OUTPATIENT
Start: 2019-12-03

## 2019-12-03 RX ORDER — NIACINAMIDE 500 MG
500 TABLET ORAL 2 TIMES DAILY WITH MEALS
COMMUNITY

## 2019-12-03 RX ORDER — ATORVASTATIN CALCIUM 80 MG/1
80 TABLET, FILM COATED ORAL DAILY
Qty: 90 TABLET | Refills: 0 | Status: SHIPPED | OUTPATIENT
Start: 2019-12-03 | End: 2020-04-06

## 2019-12-03 RX ORDER — LISINOPRIL 20 MG/1
20 TABLET ORAL DAILY
Qty: 90 TABLET | Refills: 3 | Status: SHIPPED | OUTPATIENT
Start: 2019-12-03

## 2019-12-03 RX ORDER — METOPROLOL TARTRATE 25 MG/1
25 TABLET, FILM COATED ORAL 2 TIMES DAILY
Qty: 180 TABLET | Refills: 2 | Status: SHIPPED | OUTPATIENT
Start: 2019-12-03

## 2019-12-03 RX ORDER — AMLODIPINE BESYLATE 5 MG/1
5 TABLET ORAL DAILY
Qty: 90 TABLET | Refills: 3 | Status: SHIPPED | OUTPATIENT
Start: 2019-12-03 | End: 2020-12-14

## 2019-12-03 ASSESSMENT — MIFFLIN-ST. JEOR: SCORE: 1635.72

## 2019-12-03 NOTE — LETTER
12/3/2019    [facility]  [address] [address] [address]    RE: Saul Garcia       Dear Colleague,    I had the pleasure of seeing Saul Garcia in the [facility].    HPI and Plan:   See dictation    Orders Placed This Encounter   Procedures     Basic metabolic panel     Lipid Profile     Follow-Up with Cardiac Advanced Practice Provider     Follow-Up with Cardiologist       Orders Placed This Encounter   Medications     amLODIPine (NORVASC) 5 MG tablet     Sig: Take 1 tablet (5 mg) by mouth daily     Dispense:  90 tablet     Refill:  3     atorvastatin (LIPITOR) 80 MG tablet     Sig: Take 1 tablet (80 mg) by mouth daily     Dispense:  90 tablet     Refill:  0     lisinopril (PRINIVIL/ZESTRIL) 20 MG tablet     Sig: Take 1 tablet (20 mg) by mouth daily     Dispense:  90 tablet     Refill:  3     metoprolol tartrate (LOPRESSOR) 25 MG tablet     Sig: Take 1 tablet (25 mg) by mouth 2 times daily     Dispense:  180 tablet     Refill:  2     niacinamide (NIACIN) 500 MG tablet     Sig: Take 500 mg by mouth 2 times daily (with meals)     nitroGLYcerin (NITROSTAT) 0.4 MG sublingual tablet     Sig: Place 1 tablet (0.4 mg) under the tongue every 5 minutes as needed for chest pain     Dispense:  25 tablet     Refill:  4       Medications Discontinued During This Encounter   Medication Reason     amLODIPine (NORVASC) 5 MG tablet Reorder     atorvastatin (LIPITOR) 80 MG tablet Reorder     lisinopril (PRINIVIL/ZESTRIL) 10 MG tablet Reorder     metoprolol tartrate (LOPRESSOR) 25 MG tablet Reorder     nitroglycerin (NITROSTAT) 0.4 MG SL tablet Reorder         Encounter Diagnoses   Name Primary?     Coronary artery disease involving native coronary artery of native heart without angina pectoris Yes     Ascending aorta dilation (H)      Essential hypertension, benign      Pure hypercholesterolemia        CURRENT MEDICATIONS:  Current Outpatient Medications   Medication Sig  Dispense Refill     amLODIPine (NORVASC) 5 MG tablet Take 1 tablet (5 mg) by mouth daily 90 tablet 3     aspirin 81 MG tablet Take 1 tablet by mouth daily.  3     atorvastatin (LIPITOR) 80 MG tablet Take 1 tablet (80 mg) by mouth daily 90 tablet 0     Cholecalciferol (VITAMIN D) 2000 UNITS tablet Take 1 tablet by mouth 2 times daily       ibuprofen 200 MG capsule Take 200 mg by mouth every 4 hours as needed for fever       lisinopril (PRINIVIL/ZESTRIL) 20 MG tablet Take 1 tablet (20 mg) by mouth daily 90 tablet 3     metoprolol tartrate (LOPRESSOR) 25 MG tablet Take 1 tablet (25 mg) by mouth 2 times daily 180 tablet 2     Multiple Vitamins-Minerals (PRESERVISION AREDS) TABS        niacinamide (NIACIN) 500 MG tablet Take 500 mg by mouth 2 times daily (with meals)       nitroGLYcerin (NITROSTAT) 0.4 MG sublingual tablet Place 1 tablet (0.4 mg) under the tongue every 5 minutes as needed for chest pain 25 tablet 4       ALLERGIES   No Known Allergies    PAST MEDICAL HISTORY:  Past Medical History:   Diagnosis Date      BPH      Aortic root dilatation (H)     3.7 by echo 10/13     Atrial fibrillation (H)     Post op     Basal cell carcinoma      CAD (coronary artery disease)     CABG 2011: LIMA to LAD, sequential SVG to D1 and D2, SVG to OM     Essential hypertension, benign      Pure hypercholesterolemia        PAST SURGICAL HISTORY:  Past Surgical History:   Procedure Laterality Date     BIOPSY OF SKIN LESION       CORONARY ARTERY BYPASS  march 2011     HC REMOVE TONSILS/ADENOIDS,<11 Y/O  Childhood    T & A <13y.o.     ROTATOR CUFF REPAIR RT/LT  2007    rt shoulder     SURGICAL HISTORY OF -   2/28/01    Lt. inguinal hernia repair     SURGICAL HISTORY OF - 6/88    Orchiectomy, seminoma     SURGICAL HISTORY OF -   2008    rt knee repair       FAMILY HISTORY:  Family History   Problem Relation Age of Onset     Arthritis Mother      Eye Disorder Mother         mac.degen.     Neurologic Disorder Mother         Dementia      Heart Disease Father         chf     Melanoma Brother        SOCIAL HISTORY:  Social History     Socioeconomic History     Marital status:      Spouse name: JACQUELINE     Number of children: 2     Years of education: None     Highest education level: None   Occupational History     Employer: RETIRED   Social Needs     Financial resource strain: None     Food insecurity:     Worry: None     Inability: None     Transportation needs:     Medical: None     Non-medical: None   Tobacco Use     Smoking status: Never Smoker     Smokeless tobacco: Never Used   Substance and Sexual Activity     Alcohol use: Yes     Comment: 15/week     Drug use: No     Sexual activity: Yes   Lifestyle     Physical activity:     Days per week: None     Minutes per session: None     Stress: None   Relationships     Social connections:     Talks on phone: None     Gets together: None     Attends Bahai service: None     Active member of club or organization: None     Attends meetings of clubs or organizations: None     Relationship status: None     Intimate partner violence:     Fear of current or ex partner: None     Emotionally abused: None     Physically abused: None     Forced sexual activity: None   Other Topics Concern     Parent/sibling w/ CABG, MI or angioplasty before 65F 55M? Not Asked      Service Not Asked     Blood Transfusions Not Asked     Caffeine Concern No     Comment: no caffeine     Occupational Exposure Not Asked     Hobby Hazards Not Asked     Sleep Concern No     Stress Concern Yes     Weight Concern No     Special Diet Yes     Comment: low sodium     Back Care Not Asked     Exercise Yes     Comment: biking, walking     Bike Helmet Not Asked     Seat Belt Not Asked     Self-Exams Not Asked   Social History Narrative     None       Review of Systems:  Skin:  Negative       Eyes:  Positive for glasses    ENT:  Positive for hearing loss    Respiratory:  Positive for dyspnea on exertion     Cardiovascular:     "palpitations;Positive for on occasion in the mornings  Gastroenterology: Negative      Genitourinary:  Negative      Musculoskeletal:  Positive for back pain;arthritis R Achilles tendonitis  Neurologic:  Negative      Psychiatric:  Negative      Heme/Lymph/Imm:  Negative      Endocrine:  Negative        Physical Exam:  Vitals: BP (!) 158/64   Pulse 59   Ht 1.803 m (5' 11\")   Wt 89.4 kg (197 lb)   BMI 27.48 kg/m       Constitutional:  cooperative, alert and oriented, well developed, well nourished, in no acute distress        Skin:  warm and dry to the touch, no apparent skin lesions or masses noted          Head:  normocephalic, no masses or lesions        Eyes:  pupils equal and round, conjunctivae and lids unremarkable, sclera white, no xanthalasma, EOMS intact, no nystagmus        Lymph:      ENT:  no pallor or cyanosis, dentition good        Neck:  carotid pulses are full and equal bilaterally;no carotid bruit        Respiratory:  normal breath sounds, clear to auscultation, normal A-P diameter, normal symmetry, normal respiratory excursion, no use of accessory muscles         Cardiac: regular rhythm;normal S1 and S2;no murmurs, gallops or rubs detected                pulses full and equal                                        GI:           Extremities and Muscular Skeletal:  no edema;no spinal abnormalities noted;normal muscle strength and tone              Neurological:  no gross motor deficits        Psych:  affect appropriate, oriented to time, person and place        [place] [address]                Thank you for allowing me to participate in the care of your patient.      Sincerely,     Jacques Guidry MD     [facility]    cc:   [place] [address]        "

## 2019-12-03 NOTE — PROGRESS NOTES
Service Date: 12/03/2019      CLINIC VISIT      HISTORY OF PRESENT ILLNESS:  Mr. Garcia is a very nice 79-year-old gentleman with past medical history significant for coronary artery disease, hypertension, postoperative atrial fibrillation after his bypass and mildly dilated ascending aorta.  He also drinks alcohol on a consistent basis, admitting 2-3 drinks on a daily basis.      Mr. Garcia's coronary history dates back to 04/2011 when exertional chest discomfort and palpitations when downhill skiing led to coronary bypass grafting x4 with a LIMA to his left anterior descending artery, saphenous vein graft to the obtuse marginal, a sequential saphenous vein graft to his first and second diagonal.  He has hypertension, hyperlipidemia and benign prostatic hypertrophy.      Saul returns to clinic stating he thinks he is doing quite well.  He has no chest, arm, neck, jaw or shoulder discomfort.  No lightheadedness, dizziness, syncope or near syncope.  No dyspnea on exertion, orthopnea or PND.  He notes no side effects or problems with his current medical regimen.      ASSESSMENT AND PLAN:  Mr. Garcia appears to be doing well from a cardiac standpoint without clinical evidence of ischemia, heart failure or significant arrhythmia.      Blood pressure is up again at 158/64, although when he is not seeing me, blood pressure is usually better controlled.  Given the fact he does have an ascending aortic aneurysm, we decided we would bump his medications up, increasing his lisinopril from 10 to 20 and that he would recheck his blood pressures.  He is getting ready to go to [place] and he will check his blood pressures down there and call if they are not in a good range.  We did talk about the SPRINT criteria and the SPRINT trial.      We also talked about nonmedicinal things to lower his blood pressure.  Drinking 2-3 drinks a day every day can raise this.  We talked about a low-salt diet, regular exercise.  When he is down  in [place], he states he rides his bike every day but here he does not.  They live in [facility].  [place] just not a good place to ride his bike.  He states he does do calisthenics every day and stretches but does not do as much aerobic activity up here.      Weight is 197 pounds, giving him a body mass index of 27.5.  This is up a bit over the last couple of years.  We talked about watching this and weight loss is another way to control his blood pressure.  He also takes nonsteroidals on a regular basis for arthritic reasons and I have told him that this will contribute.  Also, a diet high in fresh fruits and vegetables with potassium and magnesium will lower his blood pressure.      Fasting lipid profile is quite good.  Total cholesterol is 138, HDL is 46, LDL 71, triglycerides are 107.  We will continue his atorvastatin 80 mg daily.      Basic metabolic profile is normal.  Sodium is 140, potassium is 4.0, BUN is 18, creatinine is 1.08, GFR is greater than 60.      I did refill his medications, sent them in to [place].  He states he would like to be seen earlier in the year because oftentimes they would be headed to [place] already.  I will have him follow up with my TAMANNA in about 9 months.  I will see him in a year from then.      We did do an MRI to follow up on his ascending aorta and this year it measured 4.3 x 4.0.  I will check it again in 2 years when he comes back to see me by echocardiogram.      Jacques Jain MD, FACC         JACQUES JAIN MD, FACC             D: 2019   T: 2019   MT: VELMA      Name:     STEFANI HUYNH   MRN:      [id number]        Account:      [id number]   :      1940           Service Date: 2019      Document: [id number]

## 2019-12-03 NOTE — LETTER
12/3/2019      [facility]  [address] [address] [address]      RE: Saul Garcia       Dear Colleague,    I had the pleasure of seeing Saul Garcia in the [facility].    Service Date: 12/03/2019      CLINIC VISIT      HISTORY OF PRESENT ILLNESS:  Mr. Garcia is a very nice 79-year-old gentleman with past medical history significant for coronary artery disease, hypertension, postoperative atrial fibrillation after his bypass and mildly dilated ascending aorta.  He also drinks alcohol on a consistent basis, admitting 2-3 drinks on a daily basis.      Mr. Garcia's coronary history dates back to 04/2011 when exertional chest discomfort and palpitations when downhill skiing led to coronary bypass grafting x4 with a LIMA to his left anterior descending artery, saphenous vein graft to the obtuse marginal, a sequential saphenous vein graft to his first and second diagonal.  He has hypertension, hyperlipidemia and benign prostatic hypertrophy.      Saul returns to clinic stating he thinks he is doing quite well.  He has no chest, arm, neck, jaw or shoulder discomfort.  No lightheadedness, dizziness, syncope or near syncope.  No dyspnea on exertion, orthopnea or PND.  He notes no side effects or problems with his current medical regimen.      ASSESSMENT AND PLAN:  Mr. Garcia appears to be doing well from a cardiac standpoint without clinical evidence of ischemia, heart failure or significant arrhythmia.      Blood pressure is up again at 158/64, although when he is not seeing me, blood pressure is usually better controlled.  Given the fact he does have an ascending aortic aneurysm, we decided we would bump his medications up, increasing his lisinopril from 10 to 20 and that he would recheck his blood pressures.  He is getting ready to go to [place] and he will check his blood pressures down there and call if they are not in a good range.  We did talk about the  SPRINT criteria and the SPRINT trial.      We also talked about nonmedicinal things to lower his blood pressure.  Drinking 2-3 drinks a day every day can raise this.  We talked about a low-salt diet, regular exercise.  When he is down in [place], he states he rides his bike every day but here he does not.  They live in [facility].  [place] is just not a good place to ride his bike.  He states he does do calisthenics every day and stretches but does not do as much aerobic activity up here.      Weight is 197 pounds, giving him a body mass index of 27.5.  This is up a bit over the last couple of years.  We talked about watching this and weight loss is another way to control his blood pressure.  He also takes nonsteroidals on a regular basis for arthritic reasons and I have told him that this will contribute.  Also, a diet high in fresh fruits and vegetables with potassium and magnesium will lower his blood pressure.      Fasting lipid profile is quite good.  Total cholesterol is 138, HDL is 46, LDL 71, triglycerides are 107.  We will continue his atorvastatin 80 mg daily.      Basic metabolic profile is normal.  Sodium is 140, potassium is 4.0, BUN is 18, creatinine is 1.08, GFR is greater than 60.      I did refill his medications, sent them in to [place].  He states he would like to be seen earlier in the year because oftentimes they would be headed to [place] already.  I will have him follow up with my TAMANNA in about 9 months.  I will see him in a year from then.      We did do an MRI to follow up on his ascending aorta and this year it measured 4.3 x 4.0.  I will check it again in 2 years when he comes back to see me by echocardiogram.      Jacques Jain MD, FACC         JACQUES JAIN MD, FACC             D: 2019   T: 2019   MT: VELMA      Name:     STEFANI HUYNH   MRN:      [id number]        Account:      [id number]   :      1940            Service Date: 2019      Document: [id number]         Outpatient Encounter Medications as of 12/3/2019   Medication Sig Dispense Refill     amLODIPine (NORVASC) 5 MG tablet Take 1 tablet (5 mg) by mouth daily 90 tablet 3     aspirin 81 MG tablet Take 1 tablet by mouth daily.  3     atorvastatin (LIPITOR) 80 MG tablet Take 1 tablet (80 mg) by mouth daily 90 tablet 0     Cholecalciferol (VITAMIN D) 2000 UNITS tablet Take 1 tablet by mouth 2 times daily       ibuprofen 200 MG capsule Take 200 mg by mouth every 4 hours as needed for fever       lisinopril (PRINIVIL/ZESTRIL) 20 MG tablet Take 1 tablet (20 mg) by mouth daily 90 tablet 3     metoprolol tartrate (LOPRESSOR) 25 MG tablet Take 1 tablet (25 mg) by mouth 2 times daily 180 tablet 2     Multiple Vitamins-Minerals (PRESERVISION AREDS) TABS        niacinamide (NIACIN) 500 MG tablet Take 500 mg by mouth 2 times daily (with meals)       nitroGLYcerin (NITROSTAT) 0.4 MG sublingual tablet Place 1 tablet (0.4 mg) under the tongue every 5 minutes as needed for chest pain 25 tablet 4     [DISCONTINUED] amLODIPine (NORVASC) 5 MG tablet Take 1 tablet (5 mg) by mouth daily 90 tablet 3     [DISCONTINUED] atorvastatin (LIPITOR) 80 MG tablet Take 1 tablet (80 mg) by mouth daily 90 tablet 0     [DISCONTINUED] lisinopril (PRINIVIL/ZESTRIL) 10 MG tablet Take 1 tablet (10 mg) by mouth daily 90 tablet 3     [DISCONTINUED] metoprolol tartrate (LOPRESSOR) 25 MG tablet Take 1 tablet (25 mg) by mouth 2 times daily 180 tablet 2     [DISCONTINUED] nitroglycerin (NITROSTAT) 0.4 MG SL tablet Place 1 tablet (0.4 mg) under the tongue every 5 minutes as needed for chest pain 25 tablet 4     [] gadobutrol (GADAVIST) injection 5-65 mL        [] sodium chloride (PF) 0.9% PF flush  mL        No facility-administered encounter medications on file as of 12/3/2019.        Again, thank you for allowing me to participate in the care of your patient.       Sincerely,    Jacques Guidry MD     [facility]

## 2019-12-03 NOTE — PROGRESS NOTES
HPI and Plan:   See dictation    Orders Placed This Encounter   Procedures     Basic metabolic panel     Lipid Profile     Follow-Up with Cardiac Advanced Practice Provider     Follow-Up with Cardiologist       Orders Placed This Encounter   Medications     amLODIPine (NORVASC) 5 MG tablet     Sig: Take 1 tablet (5 mg) by mouth daily     Dispense:  90 tablet     Refill:  3     atorvastatin (LIPITOR) 80 MG tablet     Sig: Take 1 tablet (80 mg) by mouth daily     Dispense:  90 tablet     Refill:  0     lisinopril (PRINIVIL/ZESTRIL) 20 MG tablet     Sig: Take 1 tablet (20 mg) by mouth daily     Dispense:  90 tablet     Refill:  3     metoprolol tartrate (LOPRESSOR) 25 MG tablet     Sig: Take 1 tablet (25 mg) by mouth 2 times daily     Dispense:  180 tablet     Refill:  2     niacinamide (NIACIN) 500 MG tablet     Sig: Take 500 mg by mouth 2 times daily (with meals)     nitroGLYcerin (NITROSTAT) 0.4 MG sublingual tablet     Sig: Place 1 tablet (0.4 mg) under the tongue every 5 minutes as needed for chest pain     Dispense:  25 tablet     Refill:  4       Medications Discontinued During This Encounter   Medication Reason     amLODIPine (NORVASC) 5 MG tablet Reorder     atorvastatin (LIPITOR) 80 MG tablet Reorder     lisinopril (PRINIVIL/ZESTRIL) 10 MG tablet Reorder     metoprolol tartrate (LOPRESSOR) 25 MG tablet Reorder     nitroglycerin (NITROSTAT) 0.4 MG SL tablet Reorder         Encounter Diagnoses   Name Primary?     Coronary artery disease involving native coronary artery of native heart without angina pectoris Yes     Ascending aorta dilation (H)      Essential hypertension, benign      Pure hypercholesterolemia        CURRENT MEDICATIONS:  Current Outpatient Medications   Medication Sig Dispense Refill     amLODIPine (NORVASC) 5 MG tablet Take 1 tablet (5 mg) by mouth daily 90 tablet 3     aspirin 81 MG tablet Take 1 tablet by mouth daily.  3     atorvastatin (LIPITOR) 80 MG tablet Take 1 tablet (80 mg) by mouth  daily 90 tablet 0     Cholecalciferol (VITAMIN D) 2000 UNITS tablet Take 1 tablet by mouth 2 times daily       ibuprofen 200 MG capsule Take 200 mg by mouth every 4 hours as needed for fever       lisinopril (PRINIVIL/ZESTRIL) 20 MG tablet Take 1 tablet (20 mg) by mouth daily 90 tablet 3     metoprolol tartrate (LOPRESSOR) 25 MG tablet Take 1 tablet (25 mg) by mouth 2 times daily 180 tablet 2     Multiple Vitamins-Minerals (PRESERVISION AREDS) TABS        niacinamide (NIACIN) 500 MG tablet Take 500 mg by mouth 2 times daily (with meals)       nitroGLYcerin (NITROSTAT) 0.4 MG sublingual tablet Place 1 tablet (0.4 mg) under the tongue every 5 minutes as needed for chest pain 25 tablet 4       ALLERGIES   No Known Allergies    PAST MEDICAL HISTORY:  Past Medical History:   Diagnosis Date      BPH      Aortic root dilatation (H)     3.7 by echo 10/13     Atrial fibrillation (H)     Post op     Basal cell carcinoma      CAD (coronary artery disease)     CABG 2011: LIMA to LAD, sequential SVG to D1 and D2, SVG to OM     Essential hypertension, benign      Pure hypercholesterolemia        PAST SURGICAL HISTORY:  Past Surgical History:   Procedure Laterality Date     BIOPSY OF SKIN LESION       CORONARY ARTERY BYPASS  march 2011     HC REMOVE TONSILS/ADENOIDS,<13 Y/O  Childhood    T & A <13y.o.     ROTATOR CUFF REPAIR RT/LT  2007    rt shoulder     SURGICAL HISTORY OF -   2/28/01    Lt. inguinal hernia repair     SURGICAL HISTORY OF -   6/88    Orchiectomy, seminoma     SURGICAL HISTORY OF -   2008    rt knee repair       FAMILY HISTORY:  Family History   Problem Relation Age of Onset     Arthritis Mother      Eye Disorder Mother         mac.degen.     Neurologic Disorder Mother         Dementia     Heart Disease Father         chf     Melanoma Brother        SOCIAL HISTORY:  Social History     Socioeconomic History     Marital status:      Spouse name: JACQUELINE     Number of children: 2     Years of education: None      Highest education level: None   Occupational History     Employer: RETIRED   Social Needs     Financial resource strain: None     Food insecurity:     Worry: None     Inability: None     Transportation needs:     Medical: None     Non-medical: None   Tobacco Use     Smoking status: Never Smoker     Smokeless tobacco: Never Used   Substance and Sexual Activity     Alcohol use: Yes     Comment: 15/week     Drug use: No     Sexual activity: Yes   Lifestyle     Physical activity:     Days per week: None     Minutes per session: None     Stress: None   Relationships     Social connections:     Talks on phone: None     Gets together: None     Attends Restoration service: None     Active member of club or organization: None     Attends meetings of clubs or organizations: None     Relationship status: None     Intimate partner violence:     Fear of current or ex partner: None     Emotionally abused: None     Physically abused: None     Forced sexual activity: None   Other Topics Concern     Parent/sibling w/ CABG, MI or angioplasty before 65F 55M? Not Asked      Service Not Asked     Blood Transfusions Not Asked     Caffeine Concern No     Comment: no caffeine     Occupational Exposure Not Asked     Hobby Hazards Not Asked     Sleep Concern No     Stress Concern Yes     Weight Concern No     Special Diet Yes     Comment: low sodium     Back Care Not Asked     Exercise Yes     Comment: biking, walking     Bike Helmet Not Asked     Seat Belt Not Asked     Self-Exams Not Asked   Social History Narrative     None       Review of Systems:  Skin:  Negative       Eyes:  Positive for glasses    ENT:  Positive for hearing loss    Respiratory:  Positive for dyspnea on exertion     Cardiovascular:    palpitations;Positive for on occasion in the mornings  Gastroenterology: Negative      Genitourinary:  Negative      Musculoskeletal:  Positive for back pain;arthritis R Achilles tendonitis  Neurologic:  Negative      Psychiatric:   "Negative      Heme/Lymph/Imm:  Negative      Endocrine:  Negative        Physical Exam:  Vitals: BP (!) 158/64   Pulse 59   Ht 1.803 m (5' 11\")   Wt 89.4 kg (197 lb)   BMI 27.48 kg/m      Constitutional:  cooperative, alert and oriented, well developed, well nourished, in no acute distress        Skin:  warm and dry to the touch, no apparent skin lesions or masses noted          Head:  normocephalic, no masses or lesions        Eyes:  pupils equal and round, conjunctivae and lids unremarkable, sclera white, no xanthalasma, EOMS intact, no nystagmus        Lymph:      ENT:  no pallor or cyanosis, dentition good        Neck:  carotid pulses are full and equal bilaterally;no carotid bruit        Respiratory:  normal breath sounds, clear to auscultation, normal A-P diameter, normal symmetry, normal respiratory excursion, no use of accessory muscles         Cardiac: regular rhythm;normal S1 and S2;no murmurs, gallops or rubs detected                pulses full and equal                                        GI:           Extremities and Muscular Skeletal:  no edema;no spinal abnormalities noted;normal muscle strength and tone              Neurological:  no gross motor deficits        Psych:  affect appropriate, oriented to time, person and place        [facility]  [address] [address] [address]              "

## 2020-04-06 DIAGNOSIS — E78.00 PURE HYPERCHOLESTEROLEMIA: Primary | ICD-10-CM

## 2020-04-06 RX ORDER — ATORVASTATIN CALCIUM 80 MG/1
80 TABLET, FILM COATED ORAL DAILY
Qty: 90 TABLET | Refills: 2 | Status: SHIPPED | OUTPATIENT
Start: 2020-04-06

## 2020-12-14 DIAGNOSIS — I10 ESSENTIAL HYPERTENSION, BENIGN: ICD-10-CM

## 2020-12-14 DIAGNOSIS — I25.10 CORONARY ARTERY DISEASE INVOLVING NATIVE CORONARY ARTERY OF NATIVE HEART WITHOUT ANGINA PECTORIS: Primary | ICD-10-CM

## 2020-12-14 RX ORDER — AMLODIPINE BESYLATE 5 MG/1
5 TABLET ORAL DAILY
Qty: 90 TABLET | Refills: 0 | Status: SHIPPED | OUTPATIENT
Start: 2020-12-14

## 2021-01-05 ENCOUNTER — TELEPHONE (OUTPATIENT)
Dept: CARDIOLOGY | Facility: CLINIC | Age: 81
End: 2021-01-05

## 2021-01-05 NOTE — TELEPHONE ENCOUNTER
Patient called because he received a letter saying he couldn't have any refills on his meds until he has an appointment with Dr. Guidry. He is wintering in [place] and won't be back until end of May or beginning of June. I offered him a virtual appointment but he does not have a computer, only a smart phone and doesn't feel comfortable doing his appointment that way. He said his last appointment was canceled because of a snow storm. I told him I would put a note in his chart and send it to Dr. Guidry's nurse team. I asked him to write himself a note to call and schedule an appointment in mid-March to early April with Dr. Guidry for the beginning of June. He said he would do that.

## 2021-01-20 DIAGNOSIS — I25.10 CORONARY ARTERY DISEASE INVOLVING NATIVE CORONARY ARTERY OF NATIVE HEART WITHOUT ANGINA PECTORIS: Chronic | ICD-10-CM

## 2021-01-20 DIAGNOSIS — I77.810 ASCENDING AORTA DILATION (H): Primary | ICD-10-CM

## 2022-06-21 NOTE — TELEPHONE ENCOUNTER
Spoke to patient, states he is not running out of his prescriptions currently and can get refills for all of them in [place] except for amlodipine. He states he will run out of his amlodipine in March. He will be returning from [place] in May. Recommended that he contact scheduling in March to set up a follow up visit w/ Dr Guidry upon his return to [place], can refill amlodipine for him at that time. Pt verbalized understanding. Team 2 phone number provided.    Number Of Hemigard Strips Per Side: 1
